# Patient Record
Sex: FEMALE | Race: WHITE | NOT HISPANIC OR LATINO | Employment: UNEMPLOYED
[De-identification: names, ages, dates, MRNs, and addresses within clinical notes are randomized per-mention and may not be internally consistent; named-entity substitution may affect disease eponyms.]

---

## 2017-06-24 ENCOUNTER — HEALTH MAINTENANCE LETTER (OUTPATIENT)
Age: 47
End: 2017-06-24

## 2019-06-18 ENCOUNTER — ANCILLARY PROCEDURE (OUTPATIENT)
Dept: MAMMOGRAPHY | Facility: CLINIC | Age: 49
End: 2019-06-18
Attending: OBSTETRICS & GYNECOLOGY
Payer: COMMERCIAL

## 2019-06-18 DIAGNOSIS — Z12.39 SCREENING BREAST EXAMINATION: ICD-10-CM

## 2019-06-18 PROCEDURE — 77067 SCR MAMMO BI INCL CAD: CPT

## 2019-06-18 PROCEDURE — 77063 BREAST TOMOSYNTHESIS BI: CPT

## 2019-11-19 ENCOUNTER — TRANSFERRED RECORDS (OUTPATIENT)
Dept: HEALTH INFORMATION MANAGEMENT | Facility: CLINIC | Age: 49
End: 2019-11-19

## 2019-11-19 ENCOUNTER — MEDICAL CORRESPONDENCE (OUTPATIENT)
Dept: HEALTH INFORMATION MANAGEMENT | Facility: CLINIC | Age: 49
End: 2019-11-19

## 2020-01-03 ENCOUNTER — TRANSFERRED RECORDS (OUTPATIENT)
Dept: HEALTH INFORMATION MANAGEMENT | Facility: CLINIC | Age: 50
End: 2020-01-03

## 2020-01-06 ENCOUNTER — TRANSFERRED RECORDS (OUTPATIENT)
Dept: HEALTH INFORMATION MANAGEMENT | Facility: CLINIC | Age: 50
End: 2020-01-06

## 2020-01-09 ENCOUNTER — MEDICAL CORRESPONDENCE (OUTPATIENT)
Dept: HEALTH INFORMATION MANAGEMENT | Facility: CLINIC | Age: 50
End: 2020-01-09

## 2020-01-14 ENCOUNTER — MEDICAL CORRESPONDENCE (OUTPATIENT)
Dept: HEALTH INFORMATION MANAGEMENT | Facility: CLINIC | Age: 50
End: 2020-01-14

## 2020-01-21 ENCOUNTER — DOCUMENTATION ONLY (OUTPATIENT)
Dept: CARDIOLOGY | Facility: CLINIC | Age: 50
End: 2020-01-21

## 2020-01-24 ENCOUNTER — DOCUMENTATION ONLY (OUTPATIENT)
Dept: CARDIOLOGY | Facility: CLINIC | Age: 50
End: 2020-01-24

## 2020-02-06 PROBLEM — I34.0 MILD MITRAL REGURGITATION: Status: ACTIVE | Noted: 2020-02-06

## 2020-02-06 PROBLEM — I51.7 MILD CONCENTRIC LEFT VENTRICULAR HYPERTROPHY (LVH): Status: ACTIVE | Noted: 2020-02-06

## 2020-02-06 PROBLEM — I35.1 MILD AORTIC REGURGITATION: Status: ACTIVE | Noted: 2020-02-06

## 2020-02-12 ENCOUNTER — OFFICE VISIT (OUTPATIENT)
Dept: CARDIOLOGY | Facility: CLINIC | Age: 50
End: 2020-02-12
Payer: COMMERCIAL

## 2020-02-12 VITALS
BODY MASS INDEX: 29.59 KG/M2 | DIASTOLIC BLOOD PRESSURE: 88 MMHG | SYSTOLIC BLOOD PRESSURE: 112 MMHG | HEART RATE: 68 BPM | HEIGHT: 62 IN

## 2020-02-12 DIAGNOSIS — I35.1 MILD AORTIC REGURGITATION: ICD-10-CM

## 2020-02-12 DIAGNOSIS — I51.7 MILD CONCENTRIC LEFT VENTRICULAR HYPERTROPHY (LVH): Primary | ICD-10-CM

## 2020-02-12 DIAGNOSIS — I34.0 MILD MITRAL REGURGITATION: ICD-10-CM

## 2020-02-12 PROCEDURE — 99203 OFFICE O/P NEW LOW 30 MIN: CPT | Performed by: INTERNAL MEDICINE

## 2020-02-12 PROCEDURE — 93000 ELECTROCARDIOGRAM COMPLETE: CPT | Performed by: INTERNAL MEDICINE

## 2020-02-12 NOTE — LETTER
2/12/2020    Rip Hays MD  [address]  [facility] [address]    RE: Lauren Calvo       Dear Colleague,    I had the pleasure of seeing Lauren Calvo in the [facility].    HPI and Plan:   See dictation    Orders Placed This Encounter   Procedures     EKG 12-lead complete w/read - Clinics (performed today)       Orders Placed This Encounter   Medications     Cyanocobalamin (B-12 PO)     Sig: Take by mouth daily     UNABLE TO FIND     Sig: 3 times daily MEDICATION NAME: Metagest     Vitamins-Lipotropics (LIPOGEN OR)     Sig: Take by mouth daily     MAGNESIUM CITRATE PO     Sig: Take by mouth daily     UNABLE TO FIND     Sig: daily MEDICATION NAME: Cardiogenics     UNABLE TO FIND     Sig: daily MEDICATION NAME: Omega Pure EPA-     Multiple Vitamins-Minerals (ZINC PO)     Sig: Take by mouth daily     UNABLE TO FIND     Sig: daily MEDICATION NAME: Cole Meriva Curcumin Phytosome     UNABLE TO FIND     Sig: daily MEDICATION NAME: Metabolic Synergy       Medications Discontinued During This Encounter   Medication Reason     biotin 5 MG CAPS Stopped by Patient     ketoconazole (NIZORAL) 2 % shampoo Stopped by Patient     Multiple Vitamins-Minerals (MULTIVITAL) TABS Therapy completed         Encounter Diagnoses   Name Primary?     Mild concentric left ventricular hypertrophy (LVH) Yes     Mild mitral regurgitation      Mild aortic regurgitation        CURRENT MEDICATIONS:  Current Outpatient Medications   Medication Sig Dispense Refill     Cyanocobalamin (B-12 PO) Take by mouth daily       MAGNESIUM CITRATE PO Take by mouth daily       Multiple Vitamins-Minerals (ZINC PO) Take by mouth daily       UNABLE TO FIND 3 times daily MEDICATION NAME: Metagest       UNABLE TO FIND daily MEDICATION NAME: Cardiogenics       UNABLE TO FIND daily MEDICATION NAME: Omega Pure EPA-       UNABLE TO FIND daily MEDICATION NAME: Cole Meriva Curcumin Phytosome        UNABLE TO FIND daily MEDICATION NAME: Metabolic Synergy       Vitamins-Lipotropics (LIPOGEN OR) Take by mouth daily         ALLERGIES     Allergies   Allergen Reactions     Compazine [Prochlorperazine] Shortness Of Breath     Egg White [Chicken-Derived Products (Egg)]      Tested positive to eggs allergy      Midrin [Isometheptene-Apap-Dichloral] Hives       PAST MEDICAL HISTORY:  History reviewed. No pertinent past medical history.    PAST SURGICAL HISTORY:  Past Surgical History:   Procedure Laterality Date     GYN SURGERY      3 C-sections       FAMILY HISTORY:  Family History   Problem Relation Age of Onset     Hypertension Mother      Other Cancer Father         bladder        SOCIAL HISTORY:  Social History     Socioeconomic History     Marital status:      Spouse name: None     Number of children: None     Years of education: None     Highest education level: None   Occupational History     None   Social Needs     Financial resource strain: None     Food insecurity:     Worry: None     Inability: None     Transportation needs:     Medical: None     Non-medical: None   Tobacco Use     Smoking status: Never Smoker     Smokeless tobacco: Never Used   Substance and Sexual Activity     Alcohol use: Not Currently     Alcohol/week: 0.0 standard drinks     Drug use: No     Sexual activity: Yes     Partners: Male   Lifestyle     Physical activity:     Days per week: None     Minutes per session: None     Stress: None   Relationships     Social connections:     Talks on phone: None     Gets together: None     Attends Taoist service: None     Active member of club or organization: None     Attends meetings of clubs or organizations: None     Relationship status: None     Intimate partner violence:     Fear of current or ex partner: None     Emotionally abused: None     Physically abused: None     Forced sexual activity: None   Other Topics Concern     Parent/sibling w/ CABG, MI or angioplasty before 65F  "55M? Not Asked   Social History Narrative     None       Review of Systems:  Skin:  Negative       Eyes:  Negative      ENT:  Negative      Respiratory:  Negative       Cardiovascular:  Negative      Gastroenterology: Negative      Genitourinary:  Negative      Musculoskeletal:  Negative      Neurologic:  Positive for numbness or tingling of hands left arm - nerve  Psychiatric:  Negative      Heme/Lymph/Imm:  Negative      Endocrine:  Negative        Physical Exam:  Vitals: /88   Pulse 68   Ht 1.575 m (5' 2\")   BMI 29.59 kg/m       Constitutional:  cooperative, alert and oriented, well developed, well nourished, in no acute distress        Skin:  warm and dry to the touch, no apparent skin lesions or masses noted          Head:  normocephalic, no masses or lesions        Eyes:  pupils equal and round, conjunctivae and lids unremarkable, sclera white, no xanthalasma, EOMS intact, no nystagmus        Lymph:No Cervical lymphadenopathy present     ENT:  no pallor or cyanosis, dentition good        Neck:  carotid pulses are full and equal bilaterally, JVP normal, no carotid bruit        Respiratory:  normal breath sounds, clear to auscultation, normal A-P diameter, normal symmetry, normal respiratory excursion, no use of accessory muscles         Cardiac: regular rhythm, normal S1/S2, no S3 or S4, apical impulse not displaced, no murmurs, gallops or rubs                                                         GI:  abdomen soft;BS normoactive        Extremities and Muscular Skeletal:  no deformities, clubbing, cyanosis, erythema observed;no edema              Neurological:  no gross motor deficits;affect appropriate        Psych:  oriented to time, person and place        CC  No referring provider defined for this encounter.                Thank you for allowing me to participate in the care of your patient.      Sincerely,     VALERIE MESSINA MD     [facility]    cc:   No " referring provider defined for this encounter.

## 2020-02-12 NOTE — LETTER
2/12/2020      Rip Hays MD  [address] [place]  [facility] [address]      RE: Lauren Calvo       Dear Colleague,    I had the pleasure of seeing Lauren Calvo in the [facility].    Service Date: 02/12/2020      CARDIOLOGY OFFICE PROGRESS NOTE       HISTORY OF PRESENT ILLNESS:  I had the opportunity to see Ms. Lauren Calvo in Cardiology Clinic today at [facility] in [place] for consultation regarding an abnormal ECG and concerns about her echocardiogram.      Ms. Calvo was seen in November for concerns about some left arm discomfort.  She describes some aching, painful discomfort in her left elbow with some radiation up to the shoulder and down to the forearm associated with some tingling.  Her arm was tender and seemed to be painful at different times but not specifically with exertion such as running or walking or climbing stairs.  She is an avid runner and has not had any chest or left arm discomfort with activity such as running.  However, her ECG done on 11/19/2019 showed some increased voltage in her precordial leads suggestive of left ventricular hypertrophy.  This prompted an echocardiogram which was done on 01/03/2020 at [facility].  I only have the report to review.  The report indicates borderline concentric left ventricular hypertrophy and mild regurgitation of the aortic and mitral valves.  The aortic valve is referred to as trileaflet with mild sclerosis.  When I reviewed the specific measurements, the interventricular septum measured 1.0 cm and the posterior wall measured 0.9 cm.  According to echocardiogram standards, these are normal measurements.  We consider left ventricular hypertrophy to be present when the thickness exceeds 1.2 cm.  The standards reported from the lab at [facility] indicate the presence of hypertrophy at greater than 1.0 cm which I do not believe is the standard and certainly, her  measurements do not suggest the possibility of hypertrophic cardiomyopathy or even hypertension-related hypertrophy.      She has never had hypertension.  Her cholesterol numbers are excellent without medical therapy.  She has never been a smoker.  She has no family history of cardiac disease and she does not have diabetes.  Her left ventricular function is normal with an ejection fraction of 60% without regional wall motion abnormalities.      PHYSICAL EXAMINATION:     VITAL SIGNS:  Today, her blood pressure is 112/88, heart rate 68 and weight was not done today.  Her height is 5 feet 2.    LUNGS:  Clear.     CARDIAC:  Heart rhythm is regular.  She has no cardiac murmurs and no carotid bruits or edema.      IMPRESSIONS:  Ms. Lauren Calov is a 49-year-old woman who had an ECG done in November for evaluation of some left arm discomfort.  That left arm discomfort sounds quite suspicious for musculoskeletal cause and highly atypical for cardiac cause.  The pain has improved significantly and has not involved any chest discomfort symptoms.  She has no cardiac risk factors.  I do not think that she is likely to have premature coronary artery disease.  I did offer her a stress test to evaluate that further if she would like to do that but she respectfully declined.      I repeated the ECG today and the voltage appears normal and the ECG is otherwise normal today.  She had an echocardiogram at the beginning of January which is likely a normal study with normal wall thickness and a minimal degree of central aortic regurgitation and presence of a trileaflet aortic valve.  I reassured her that the mild aortic valve regurgitation is not likely to progress to any significant degree and does not require further evaluation or treatment at this time.  Certainly, her echocardiogram does not suggest the presence of left ventricular hypertrophy or hypertrophic cardiomyopathy.  No additional evaluation is necessary or recommended.       I encouraged her to contact me if she has any progression in her left arm discomfort symptoms, especially with involvement of chest discomfort and we will certainly proceed with additional evaluation including stress testing.  At this time, I think the risk of this being a cardiac issue is very low and do not need to pursue that further.      Thank you for allowing me to participate in Ms. Sevilla's care.      Valerie Messina MD, FACC       cc:   Rip Hays MD    [address]   [place] [address]         VALERIE MESSINA MD, [facility]             D: 2020   T: 2020   MT: JULIO      Name:     SEAN SEVILLA   MRN:      [id number]        Account:      [id number]   :      1970           Service Date: 2020      Document: [id number]         Outpatient Encounter Medications as of 2020   Medication Sig Dispense Refill     Cyanocobalamin (B-12 PO) Take by mouth daily       MAGNESIUM CITRATE PO Take by mouth daily       Multiple Vitamins-Minerals (ZINC PO) Take by mouth daily       UNABLE TO FIND 3 times daily MEDICATION NAME: Metagest       UNABLE TO FIND daily MEDICATION NAME: Cardiogenics       UNABLE TO FIND daily MEDICATION NAME: Omega Pure EPA-       UNABLE TO FIND daily MEDICATION NAME: Cole Meriva Curcumin Phytosome       UNABLE TO FIND daily MEDICATION NAME: Metabolic Synergy       Vitamins-Lipotropics (LIPOGEN OR) Take by mouth daily       [DISCONTINUED] biotin 5 MG CAPS Take 1 capsule by mouth       [DISCONTINUED] ketoconazole (NIZORAL) 2 % shampoo   6     [DISCONTINUED] Multiple Vitamins-Minerals (MULTIVITAL) TABS Take 1 tablet by mouth       No facility-administered encounter medications on file as of 2020.        Again, thank you for allowing me to participate in the care of your patient.      Sincerely,    VALERIE MESSINA MD     [facility]

## 2020-02-12 NOTE — PROGRESS NOTES
Service Date: 02/12/2020      CARDIOLOGY OFFICE PROGRESS NOTE       HISTORY OF PRESENT ILLNESS:  I had the opportunity to see Ms. Lauren Calvo in Cardiology Clinic today at [facility] in [place] for consultation regarding an abnormal ECG and concerns about her echocardiogram.      Ms. Calvo was seen in November for concerns about some left arm discomfort.  She describes some aching, painful discomfort in her left elbow with some radiation up to the shoulder and down to the forearm associated with some tingling.  Her arm was tender and seemed to be painful at different times but not specifically with exertion such as running or walking or climbing stairs.  She is an avid runner and has not had any chest or left arm discomfort with activity such as running.  However, her ECG done on 11/19/2019 showed some increased voltage in her precordial leads suggestive of left ventricular hypertrophy.  This prompted an echocardiogram which was done on 01/03/2020 at [facility].  I only have the report to review.  The report indicates borderline concentric left ventricular hypertrophy and mild regurgitation of the aortic and mitral valves.  The aortic valve is referred to as trileaflet with mild sclerosis.  When I reviewed the specific measurements, the interventricular septum measured 1.0 cm and the posterior wall measured 0.9 cm.  According to echocardiogram standards, these are normal measurements.  We consider left ventricular hypertrophy to be present when the thickness exceeds 1.2 cm.  The standards reported from the lab at [facility] indicate the presence of hypertrophy at greater than 1.0 cm which I do not believe is the standard and certainly, her measurements do not suggest the possibility of hypertrophic cardiomyopathy or even hypertension-related hypertrophy.      She has never had hypertension.  Her cholesterol numbers are excellent without medical therapy.  She has never been a smoker.   She has no family history of cardiac disease and she does not have diabetes.  Her left ventricular function is normal with an ejection fraction of 60% without regional wall motion abnormalities.      PHYSICAL EXAMINATION:     VITAL SIGNS:  Today, her blood pressure is 112/88, heart rate 68 and weight was not done today.  Her height is 5 feet 2.    LUNGS:  Clear.     CARDIAC:  Heart rhythm is regular.  She has no cardiac murmurs and no carotid bruits or edema.      IMPRESSIONS:  Ms. Lauren Calvo is a 49-year-old woman who had an ECG done in November for evaluation of some left arm discomfort.  That left arm discomfort sounds quite suspicious for musculoskeletal cause and highly atypical for cardiac cause.  The pain has improved significantly and has not involved any chest discomfort symptoms.  She has no cardiac risk factors.  I do not think that she is likely to have premature coronary artery disease.  I did offer her a stress test to evaluate that further if she would like to do that but she respectfully declined.      I repeated the ECG today and the voltage appears normal and the ECG is otherwise normal today.  She had an echocardiogram at the beginning of January which is likely a normal study with normal wall thickness and a minimal degree of central aortic regurgitation and presence of a trileaflet aortic valve.  I reassured her that the mild aortic valve regurgitation is not likely to progress to any significant degree and does not require further evaluation or treatment at this time.  Certainly, her echocardiogram does not suggest the presence of left ventricular hypertrophy or hypertrophic cardiomyopathy.  No additional evaluation is necessary or recommended.      I encouraged her to contact me if she has any progression in her left arm discomfort symptoms, especially with involvement of chest discomfort and we will certainly proceed with additional evaluation including stress testing.  At this time, I  think the risk of this being a cardiac issue is very low and do not need to pursue that further.      Thank you for allowing me to participate in Ms. Sevilla's care.      Valerie Messina MD, [facility]       cc:   Rip Hays MD    [address]   [place] [address]         VALERIE MESSINA MD, FACC             D: 2020   T: 2020   MT: JULIO      Name:     SEAN SEVILLA   MRN:      [id number]        Account:      [id number]   :      1970           Service Date: 2020      Document: [id number]

## 2020-02-12 NOTE — PROGRESS NOTES
HPI and Plan:   See dictation    Orders Placed This Encounter   Procedures     EKG 12-lead complete w/read - Clinics (performed today)       Orders Placed This Encounter   Medications     Cyanocobalamin (B-12 PO)     Sig: Take by mouth daily     UNABLE TO FIND     Sig: 3 times daily MEDICATION NAME: Metagest     Vitamins-Lipotropics (LIPOGEN OR)     Sig: Take by mouth daily     MAGNESIUM CITRATE PO     Sig: Take by mouth daily     UNABLE TO FIND     Sig: daily MEDICATION NAME: Cardiogenics     UNABLE TO FIND     Sig: daily MEDICATION NAME: Omega Pure EPA-     Multiple Vitamins-Minerals (ZINC PO)     Sig: Take by mouth daily     UNABLE TO FIND     Sig: daily MEDICATION NAME: Cole Meriva Curcumin Phytosome     UNABLE TO FIND     Sig: daily MEDICATION NAME: Metabolic Synergy       Medications Discontinued During This Encounter   Medication Reason     biotin 5 MG CAPS Stopped by Patient     ketoconazole (NIZORAL) 2 % shampoo Stopped by Patient     Multiple Vitamins-Minerals (MULTIVITAL) TABS Therapy completed         Encounter Diagnoses   Name Primary?     Mild concentric left ventricular hypertrophy (LVH) Yes     Mild mitral regurgitation      Mild aortic regurgitation        CURRENT MEDICATIONS:  Current Outpatient Medications   Medication Sig Dispense Refill     Cyanocobalamin (B-12 PO) Take by mouth daily       MAGNESIUM CITRATE PO Take by mouth daily       Multiple Vitamins-Minerals (ZINC PO) Take by mouth daily       UNABLE TO FIND 3 times daily MEDICATION NAME: Metagest       UNABLE TO FIND daily MEDICATION NAME: Cardiogenics       UNABLE TO FIND daily MEDICATION NAME: Omega Pure EPA-       UNABLE TO FIND daily MEDICATION NAME: Cole Meriva Curcumin Phytosome       UNABLE TO FIND daily MEDICATION NAME: Metabolic Synergy       Vitamins-Lipotropics (LIPOGEN OR) Take by mouth daily         ALLERGIES     Allergies   Allergen Reactions     Compazine [Prochlorperazine] Shortness Of Breath     Egg White  [Chicken-Derived Products (Egg)]      Tested positive to eggs allergy      Midrin [Isometheptene-Apap-Dichloral] Hives       PAST MEDICAL HISTORY:  History reviewed. No pertinent past medical history.    PAST SURGICAL HISTORY:  Past Surgical History:   Procedure Laterality Date     GYN SURGERY      3 C-sections       FAMILY HISTORY:  Family History   Problem Relation Age of Onset     Hypertension Mother      Other Cancer Father         bladder        SOCIAL HISTORY:  Social History     Socioeconomic History     Marital status:      Spouse name: None     Number of children: None     Years of education: None     Highest education level: None   Occupational History     None   Social Needs     Financial resource strain: None     Food insecurity:     Worry: None     Inability: None     Transportation needs:     Medical: None     Non-medical: None   Tobacco Use     Smoking status: Never Smoker     Smokeless tobacco: Never Used   Substance and Sexual Activity     Alcohol use: Not Currently     Alcohol/week: 0.0 standard drinks     Drug use: No     Sexual activity: Yes     Partners: Male   Lifestyle     Physical activity:     Days per week: None     Minutes per session: None     Stress: None   Relationships     Social connections:     Talks on phone: None     Gets together: None     Attends Gnosticism service: None     Active member of club or organization: None     Attends meetings of clubs or organizations: None     Relationship status: None     Intimate partner violence:     Fear of current or ex partner: None     Emotionally abused: None     Physically abused: None     Forced sexual activity: None   Other Topics Concern     Parent/sibling w/ CABG, MI or angioplasty before 65F 55M? Not Asked   Social History Narrative     None       Review of Systems:  Skin:  Negative       Eyes:  Negative      ENT:  Negative      Respiratory:  Negative       Cardiovascular:  Negative      Gastroenterology: Negative     "  Genitourinary:  Negative      Musculoskeletal:  Negative      Neurologic:  Positive for numbness or tingling of hands left arm - nerve  Psychiatric:  Negative      Heme/Lymph/Imm:  Negative      Endocrine:  Negative        Physical Exam:  Vitals: /88   Pulse 68   Ht 1.575 m (5' 2\")   BMI 29.59 kg/m      Constitutional:  cooperative, alert and oriented, well developed, well nourished, in no acute distress        Skin:  warm and dry to the touch, no apparent skin lesions or masses noted          Head:  normocephalic, no masses or lesions        Eyes:  pupils equal and round, conjunctivae and lids unremarkable, sclera white, no xanthalasma, EOMS intact, no nystagmus        Lymph:No Cervical lymphadenopathy present     ENT:  no pallor or cyanosis, dentition good        Neck:  carotid pulses are full and equal bilaterally, JVP normal, no carotid bruit        Respiratory:  normal breath sounds, clear to auscultation, normal A-P diameter, normal symmetry, normal respiratory excursion, no use of accessory muscles         Cardiac: regular rhythm, normal S1/S2, no S3 or S4, apical impulse not displaced, no murmurs, gallops or rubs                                                         GI:  abdomen soft;BS normoactive        Extremities and Muscular Skeletal:  no deformities, clubbing, cyanosis, erythema observed;no edema              Neurological:  no gross motor deficits;affect appropriate        Psych:  oriented to time, person and place        CC  No referring provider defined for this encounter.              "

## 2021-01-15 ENCOUNTER — HEALTH MAINTENANCE LETTER (OUTPATIENT)
Age: 51
End: 2021-01-15

## 2021-01-24 ENCOUNTER — HEALTH MAINTENANCE LETTER (OUTPATIENT)
Age: 51
End: 2021-01-24

## 2021-09-05 ENCOUNTER — HEALTH MAINTENANCE LETTER (OUTPATIENT)
Age: 51
End: 2021-09-05

## 2022-02-19 ENCOUNTER — HEALTH MAINTENANCE LETTER (OUTPATIENT)
Age: 52
End: 2022-02-19

## 2022-10-22 ENCOUNTER — HEALTH MAINTENANCE LETTER (OUTPATIENT)
Age: 52
End: 2022-10-22

## 2023-04-01 ENCOUNTER — HEALTH MAINTENANCE LETTER (OUTPATIENT)
Age: 53
End: 2023-04-01

## 2023-05-03 ENCOUNTER — TRANSCRIBE ORDERS (OUTPATIENT)
Dept: OTHER | Age: 53
End: 2023-05-03

## 2023-05-03 DIAGNOSIS — M25.561 PAIN IN LATERAL PORTION OF RIGHT KNEE: Primary | ICD-10-CM

## 2023-08-21 ENCOUNTER — TELEPHONE (OUTPATIENT)
Dept: CARDIOLOGY | Facility: CLINIC | Age: 53
End: 2023-08-21
Payer: COMMERCIAL

## 2023-08-21 NOTE — TELEPHONE ENCOUNTER
Patient states that she had a syncopal episode that resulted in an ED visit.  Patient states that she has had syncope in the past since a young age.  Patient is now wearing a zio patch.  Patient is asking if she needs a cardiology appointment. Discussed with patient that if advised by ED to see cardiology she should make an appointment.  Patient verbalized understanding and agreement with plan and will call to schedule.  Alie Quiroz RN on 8/21/2023 at 12:37 PM

## 2023-08-21 NOTE — TELEPHONE ENCOUNTER
[facility] Call Center    Phone Message    May a detailed message be left on voicemail: yes     Reason for Call: Other: Patient called wanting to speak with Dr. Pierson or someone on the care team. Patient stated they had an episode of syncope over the weekend where they fell in the shower and hit their head causing a cut. Patient went to the ER and was given a ziopatch monitor to put on, and was told to make an appointment to see their cardiologist. Patient is wondering if they should still come in to be seen, if results come back as normal. Patient last seen Dr. Pierson in 2020. Please call patient back to further discuss.     Action Taken: Other: Cardiology    Travel Screening: Not Applicable    Thank you!  [facility]

## 2023-09-27 ENCOUNTER — OFFICE VISIT (OUTPATIENT)
Dept: CARDIOLOGY | Facility: CLINIC | Age: 53
End: 2023-09-27
Payer: COMMERCIAL

## 2023-09-27 VITALS
HEART RATE: 67 BPM | BODY MASS INDEX: 29.59 KG/M2 | DIASTOLIC BLOOD PRESSURE: 87 MMHG | OXYGEN SATURATION: 100 % | SYSTOLIC BLOOD PRESSURE: 125 MMHG | HEIGHT: 62 IN

## 2023-09-27 DIAGNOSIS — R55 SYNCOPE, UNSPECIFIED SYNCOPE TYPE: Primary | ICD-10-CM

## 2023-09-27 PROCEDURE — 99204 OFFICE O/P NEW MOD 45 MIN: CPT | Performed by: INTERNAL MEDICINE

## 2023-09-27 PROCEDURE — 93000 ELECTROCARDIOGRAM COMPLETE: CPT | Performed by: INTERNAL MEDICINE

## 2023-09-27 NOTE — PROGRESS NOTES
"  General Cardiology Clinic Progress Note  Lauren Calvo MRN# [id number]   YOB: 1970 Age: 52 year old       Reason for visit: Recurrent syncope    History of presenting illness:    I had the opportunity to see Lauren Calvo at [facility] today for evaluation of recurrent syncope.  Since the age of 14, she has had occasional episodes of sudden syncope.  This situation is always the same.  She wakes up in the middle of the night, feeling nauseous, gets out of bed to go to the bathroom because she is worried about vomiting, and then finds herself on the floor unable to move.  She has no lightheadedness that precedes these events and feels no palpitations.  These episodes have occurred 2-3 times a year for the last 40 years.  Most recently, she had an episode on 8/19/2023 which was very similar to the previous ones.  However, this time she fell forward, striking her head on the floor, and sustained a laceration to her head.  She woke up in a pool of blood.  Her  woke up as well and tried to get her up but she lost consciousness briefly again when he did so.  The duration of unconsciousness is brief, but her family has noted that she appears to be either staring straight forward and unresponsive briefly or her eyes are rolled back in her head.  When she regains awareness, she is typically unable to move for a few moments and requires that her system \"reboots\" before she is able to sit up again and move normally.    She is normally very active, running 18 miles a week, 10-minute miles, with no symptoms of shortness of breath or chest discomfort.  She is normally very active and energetic with no palpitations lightheadedness or syncopal events during the day.  She has never had vasovagal events in the usual settings such as during prolonged standing, or during episodes of nausea or pain during the daytime.  She has had blood drawn many times and has no problem with that.    She had an " echocardiogram done in 2020 which looked essentially normal with mild aortic and mitral valve regurgitation.    She had an EKG done today which demonstrates normal sinus rhythm without abnormality.  She had a Zio patch monitor for 12 days through [facility].  I reviewed that result.  Her rhythm was normal sinus with rare PACs and PVCs.  She had a few episodes of brief SVT lasting less than 10 beats but no arrhythmias that could cause syncope    Her examination is normal her blood pressure is 125/87 with a heart rate of 67.  Her heart rhythm is regular with no cardiac murmurs            Assessment and Plan:     ASSESSMENT:    Ms. Cydney Calvo is a 52-year-old woman with recurrent episodes of nighttime syncope 2-3 times a year over the last nearly 40 years.  During her most recent episode in August, she sustained a laceration to her forehead and required stitches.  She is now here to discuss possible cardiac etiologies of this problem.  She has seen neurology and has not identified any specific neurologic issue.    Her episodes sound vasovagal in many ways.  She has preceding nausea and wakes up quickly after falling to the floor.  Other types of cardiac arrhythmias such as intermittent complete heart block would also be a consideration.  This is unlikely to be seizure based on the fact that she has no postictal confusion.    Her Zio patch monitor did not show any explanation for syncope.  She did not capture an episode on the Zio patch monitor nor has she captured an episode during any previous monitoring session.  I suggested that we implant a loop recorder to understand more about her heart rhythm at the time of her episode.  We discussed the risk of this procedure, primarily involving the possibility of infection and bleeding.  She understands and agrees to go ahead with that implantation procedure.      I also suggested that her  take her blood pressure at the time of her fainting or near fainting  "episodes.  They have an automated cuff at home.    I will then meet with her in 6 months to review the results of her loop recorder or sooner if we see any significant arrhythmias.    Stevan Pierson MD           Orders this Visit:  Orders Placed This Encounter   Procedures    Follow-Up with Cardiology    EKG 12-lead complete w/read - Clinics (performed today)    Case Request EP: Loop Recorder Implant     No orders of the defined types were placed in this encounter.    There are no discontinued medications.    Today's clinic visit entailed:  Review of the result(s) of each unique test - ECG, Zio patch monitor, echocardiogram  The following tests were independently interpreted by me as noted in my documentation: ECG  Ordering of each unique test  Prescription drug management  40 minutes spent by me on the date of the encounter doing chart review, history and exam, documentation and further activities per the note  Provider  Link to [facility] Help Grid     The level of medical decision making during this visit was of high complexity.           Review of Systems:     Review of Systems:  Skin:  Negative     Eyes:  Negative    ENT:  Negative    Respiratory:  Negative    Cardiovascular:  exercise intolerance;fatigue;Negative for;dizziness;lightheadedness;chest pain;palpitations;edema    Gastroenterology: Negative    Genitourinary:  Negative    Musculoskeletal:  Negative    Neurologic:  Negative    Psychiatric:  Negative    Heme/Lymph/Imm:  Negative    Endocrine:  Negative              Physical Exam:     Vitals: /87 (BP Location: Left arm, Patient Position: Sitting)   Pulse 67   Ht 1.575 m (5' 2\")   SpO2 100%   BMI 29.59 kg/m    Constitutional: Well nourished and in no apparent distress.  Eyes: Pupils equal, round. Sclerae anicteric.   HEENT: Normocephalic, atraumatic.   Neck: Supple. JVD   Respiratory: Breathing non-labored. Lungs clear to auscultation bilaterally. No crackles, wheezes, rhonchi, or " rales.  Cardiovascular:  Regular rate and rhythm, normal S1 and S2. No murmur, rub, or gallop.  Skin: Warm, dry. No rashes, cyanosis, or xanthelasma.  Extremities: No edema.  Neurologic: No gross motor deficits. Alert, awake, and oriented to person, place and time.  Psychiatric: Affect appropriate.             Medications:     Current Outpatient Medications   Medication Sig Dispense Refill    Cyanocobalamin (B-12 PO) Take by mouth daily      MAGNESIUM CITRATE PO Take by mouth daily      Multiple Vitamins-Minerals (ZINC PO) Take by mouth daily      Vitamins-Lipotropics (LIPOGEN OR) Take by mouth daily      UNABLE TO FIND 3 times daily MEDICATION NAME: Metagest      UNABLE TO FIND daily MEDICATION NAME: Cardiogenics      UNABLE TO FIND daily MEDICATION NAME: Omega Pure EPA-      UNABLE TO FIND daily MEDICATION NAME: Cole Meriva Curcumin Phytosome      UNABLE TO FIND daily MEDICATION NAME: Metabolic Synergy         Family History   Problem Relation Age of Onset    Hypertension Mother     Other Cancer Father         bladder        Social History     Socioeconomic History    Marital status:      Spouse name: Not on file    Number of children: Not on file    Years of education: Not on file    Highest education level: Not on file   Occupational History    Not on file   Tobacco Use    Smoking status: Never    Smokeless tobacco: Never   Substance and Sexual Activity    Alcohol use: Yes     Comment: occ.    Drug use: No    Sexual activity: Yes     Partners: Male   Other Topics Concern    Parent/sibling w/ CABG, MI or angioplasty before 65F 55M? Not Asked   Social History Narrative    Not on file     Social Determinants of Health     Financial Resource Strain: Not on file   Food Insecurity: Not on file   Transportation Needs: Not on file   Physical Activity: Not on file   Stress: Not on file   Social Connections: Not on file   Interpersonal Safety: Not on file   Housing Stability: Not on file            Past  Medical History:   History reviewed. No pertinent past medical history.           Past Surgical History:     Past Surgical History:   Procedure Laterality Date    GYN SURGERY      3 C-sections              Allergies:   Compazine [prochlorperazine], Egg white [chicken-derived products (egg)], and Midrin [isometheptene-apap-dichloral]       Data:   All laboratory data reviewed:    No lab results found.    Invalid input(s): CMP, CBC    Lab Results   Component Value Date    WBC 8.4 12/15/2015    RBC 3.92 12/15/2015    HGB 12.3 12/15/2015    HCT 37.0 12/15/2015    MCV 94 12/15/2015    MCH 31.4 12/15/2015    MCHC 33.2 12/15/2015    RDW 11.2 12/15/2015     12/15/2015       Lab Results   Component Value Date     12/15/2015    POTASSIUM 3.8 12/15/2015    CHLORIDE 106 12/15/2015    CO2 27 12/15/2015    ANIONGAP 8 12/15/2015    GLC 97 12/15/2015    BUN 9 12/15/2015    CR 0.84 12/15/2015    GFRESTIMATED 72 12/15/2015    GFRESTBLACK 88 12/15/2015    CAROL 9.1 12/15/2015      Lab Results   Component Value Date    AST 14 12/15/2015    ALT 21 12/15/2015       No results found for: A1C    No results found for: VALERIE NICOLE MD  [facility]

## 2023-09-27 NOTE — LETTER
"9/27/2023    Rip Hays MD  [address] [place]  [facility] [address]    RE: Lauren Calvo       Dear Colleague,     I had the pleasure of seeing Lauren Calvo in the [facility].    General Cardiology Clinic Progress Note  Lauren Calvo MRN# [id number]   YOB: 1970 Age: 52 year old       Reason for visit: Recurrent syncope    History of presenting illness:    I had the opportunity to see Lauren Calvo at [facility] today for evaluation of recurrent syncope.  Since the age of 14, she has had occasional episodes of sudden syncope.  This situation is always the same.  She wakes up in the middle of the night, feeling nauseous, gets out of bed to go to the bathroom because she is worried about vomiting, and then finds herself on the floor unable to move.  She has no lightheadedness that precedes these events and feels no palpitations.  These episodes have occurred 2-3 times a year for the last 40 years.  Most recently, she had an episode on 8/19/2023 which was very similar to the previous ones.  However, this time she fell forward, striking her head on the floor, and sustained a laceration to her head.  She woke up in a pool of blood.  Her  woke up as well and tried to get her up but she lost consciousness briefly again when he did so.  The duration of unconsciousness is brief, but her family has noted that she appears to be either staring straight forward and unresponsive briefly or her eyes are rolled back in her head.  When she regains awareness, she is typically unable to move for a few moments and requires that her system \"reboots\" before she is able to sit up again and move normally.    She is normally very active, running 18 miles a week, 10-minute miles, with no symptoms of shortness of breath or chest discomfort.  She is normally very active and energetic with no palpitations lightheadedness or syncopal events during the day.  She " has never had vasovagal events in the usual settings such as during prolonged standing, or during episodes of nausea or pain during the daytime.  She has had blood drawn many times and has no problem with that.    She had an echocardiogram done in 2020 which looked essentially normal with mild aortic and mitral valve regurgitation.    She had an EKG done today which demonstrates normal sinus rhythm without abnormality.  She had a Zio patch monitor for 12 days through [facility].  I reviewed that result.  Her rhythm was normal sinus with rare PACs and PVCs.  She had a few episodes of brief SVT lasting less than 10 beats but no arrhythmias that could cause syncope    Her examination is normal her blood pressure is 125/87 with a heart rate of 67.  Her heart rhythm is regular with no cardiac murmurs            Assessment and Plan:     ASSESSMENT:    Ms. Cydney Calvo is a 52-year-old woman with recurrent episodes of nighttime syncope 2-3 times a year over the last nearly 40 years.  During her most recent episode in August, she sustained a laceration to her forehead and required stitches.  She is now here to discuss possible cardiac etiologies of this problem.  She has seen neurology and has not identified any specific neurologic issue.    Her episodes sound vasovagal in many ways.  She has preceding nausea and wakes up quickly after falling to the floor.  Other types of cardiac arrhythmias such as intermittent complete heart block would also be a consideration.  This is unlikely to be seizure based on the fact that she has no postictal confusion.    Her Zio patch monitor did not show any explanation for syncope.  She did not capture an episode on the Zio patch monitor nor has she captured an episode during any previous monitoring session.  I suggested that we implant a loop recorder to understand more about her heart rhythm at the time of her episode.  I also suggested that her  take her blood pressure at  "these times.  They have an automated cuff at home.    I will then meet with her in 6 months to review the results of her loop recorder or sooner if we see any significant arrhythmias.    Stevan Pierson MD           Orders this Visit:  Orders Placed This Encounter   Procedures    Follow-Up with Cardiology    EKG 12-lead complete w/read - Clinics (performed today)    Case Request EP: Loop Recorder Implant     No orders of the defined types were placed in this encounter.    There are no discontinued medications.    Today's clinic visit entailed:  Review of the result(s) of each unique test - ECG, Zio patch monitor, echocardiogram  The following tests were independently interpreted by me as noted in my documentation: ECG  Ordering of each unique test  Prescription drug management  40 minutes spent by me on the date of the encounter doing chart review, history and exam, documentation and further activities per the note  Provider  Link to [facility] Help Grid     The level of medical decision making during this visit was of high complexity.           Review of Systems:     Review of Systems:  Skin:  Negative     Eyes:  Negative    ENT:  Negative    Respiratory:  Negative    Cardiovascular:  exercise intolerance;fatigue;Negative for;dizziness;lightheadedness;chest pain;palpitations;edema    Gastroenterology: Negative    Genitourinary:  Negative    Musculoskeletal:  Negative    Neurologic:  Negative    Psychiatric:  Negative    Heme/Lymph/Imm:  Negative    Endocrine:  Negative              Physical Exam:     Vitals: /87 (BP Location: Left arm, Patient Position: Sitting)   Pulse 67   Ht 1.575 m (5' 2\")   SpO2 100%   BMI 29.59 kg/m    Constitutional: Well nourished and in no apparent distress.  Eyes: Pupils equal, round. Sclerae anicteric.   HEENT: Normocephalic, atraumatic.   Neck: Supple. JVD   Respiratory: Breathing non-labored. Lungs clear to auscultation bilaterally. No crackles, wheezes, rhonchi, or " rales.  Cardiovascular:  Regular rate and rhythm, normal S1 and S2. No murmur, rub, or gallop.  Skin: Warm, dry. No rashes, cyanosis, or xanthelasma.  Extremities: No edema.  Neurologic: No gross motor deficits. Alert, awake, and oriented to person, place and time.  Psychiatric: Affect appropriate.             Medications:     Current Outpatient Medications   Medication Sig Dispense Refill    Cyanocobalamin (B-12 PO) Take by mouth daily      MAGNESIUM CITRATE PO Take by mouth daily      Multiple Vitamins-Minerals (ZINC PO) Take by mouth daily      Vitamins-Lipotropics (LIPOGEN OR) Take by mouth daily      UNABLE TO FIND 3 times daily MEDICATION NAME: Metagest      UNABLE TO FIND daily MEDICATION NAME: Cardiogenics      UNABLE TO FIND daily MEDICATION NAME: Omega Pure EPA-      UNABLE TO FIND daily MEDICATION NAME: Cole Meriva Curcumin Phytosome      UNABLE TO FIND daily MEDICATION NAME: Metabolic Synergy         Family History   Problem Relation Age of Onset    Hypertension Mother     Other Cancer Father         bladder        Social History     Socioeconomic History    Marital status:      Spouse name: Not on file    Number of children: Not on file    Years of education: Not on file    Highest education level: Not on file   Occupational History    Not on file   Tobacco Use    Smoking status: Never    Smokeless tobacco: Never   Substance and Sexual Activity    Alcohol use: Yes     Comment: occ.    Drug use: No    Sexual activity: Yes     Partners: Male   Other Topics Concern    Parent/sibling w/ CABG, MI or angioplasty before 65F 55M? Not Asked   Social History Narrative    Not on file     Social Determinants of Health     Financial Resource Strain: Not on file   Food Insecurity: Not on file   Transportation Needs: Not on file   Physical Activity: Not on file   Stress: Not on file   Social Connections: Not on file   Interpersonal Safety: Not on file   Housing Stability: Not on file            Past  Medical History:   History reviewed. No pertinent past medical history.           Past Surgical History:     Past Surgical History:   Procedure Laterality Date    GYN SURGERY      3 C-sections              Allergies:   Compazine [prochlorperazine], Egg white [chicken-derived products (egg)], and Midrin [isometheptene-apap-dichloral]       Data:   All laboratory data reviewed:    No lab results found.    Invalid input(s): CMP, CBC    Lab Results   Component Value Date    WBC 8.4 12/15/2015    RBC 3.92 12/15/2015    HGB 12.3 12/15/2015    HCT 37.0 12/15/2015    MCV 94 12/15/2015    MCH 31.4 12/15/2015    MCHC 33.2 12/15/2015    RDW 11.2 12/15/2015     12/15/2015       Lab Results   Component Value Date     12/15/2015    POTASSIUM 3.8 12/15/2015    CHLORIDE 106 12/15/2015    CO2 27 12/15/2015    ANIONGAP 8 12/15/2015    GLC 97 12/15/2015    BUN 9 12/15/2015    CR 0.84 12/15/2015    GFRESTIMATED 72 12/15/2015    GFRESTBLACK 88 12/15/2015    CAROL 9.1 12/15/2015      Lab Results   Component Value Date    AST 14 12/15/2015    ALT 21 12/15/2015       No results found for: A1C    No results found for: INR      VALERIE MESSINA MD  [facility]    Thank you for allowing me to participate in the care of your patient.      Sincerely,     VALERIE MESSINA MD     [facility]  cc:   Referred Self,

## 2023-09-27 NOTE — PATIENT INSTRUCTIONS
It was a pleasure seeing you today and thank you for allowing me to be a part of your health care team.  Should you have any questions regarding your visit or future needs please feel free to reach out to my care team for assistance.      Thank you, Dr. Stevan Pierson        **Nursing: [phone]       **Scheduling: [phone]

## 2023-10-11 DIAGNOSIS — R55 SYNCOPE: Primary | ICD-10-CM

## 2023-10-11 RX ORDER — LIDOCAINE 40 MG/G
CREAM TOPICAL
Status: CANCELLED | OUTPATIENT
Start: 2023-10-11

## 2023-10-11 NOTE — PROGRESS NOTES
Called patient with pre-procedure instructions for Loop implant and left message to call back.     Anticoagulation: (No hold for NOAC)     Pt informed to stop solid foods 8 hrs before procedure. (8:00 am)     Stop Clear liquids 2 hrs before procedure. (2:00 pm)     Instructed pt to shower the morning of the procedure, and then put on a clean shirt in order to help prevent infection.      Patient is aware there will only be a local anesthetic used. No need for  nor do they need to have someone stay with them overnight.     Asked pt to take temperature the morning of the procedure and call [facility] at [phone] if it is above 100.0 or if they are feeling unwell the morning of the procedure or evening before.      Pt aware of arrival time 2:00 PM (lab work is needed) and location   Pt aware procedure time is only an estimate.

## 2023-10-12 NOTE — PROGRESS NOTES
Received message from patient returning call to review pre-procedure instructions.  Attempted to call patient back on the cell phone number provided and there was no answer and the voicemail box was full.  Called and left a general message on patient's home line requesting a call back.     MARGARETTE Rondon     1500 addendum:  Received call back from patient.  Reviewed instructions as outlined in MARGARETTE Schumacher's note.  Pt verbalized understanding of instructions.  Also reviewed how loop recorders work, normal monitoring period, and what to do if patient has a symptomatic episode.  Pt was appreciative of all the information and will call with further questions.  MARGARETTE Rondon

## 2023-10-13 NOTE — PROGRESS NOTES
Chart reviewed for upcoming procedure on 10/16/23  CSE: Yes  Pertinent allergies: No  Contrast allergy: No  Anticoagulation: No  GLP-1: No  Orders in place: Yes  H&P completed: 9/27/23  Heart Clinic pre-procedure phone call: 10/12/23

## 2023-10-16 ENCOUNTER — HOSPITAL ENCOUNTER (OUTPATIENT)
Facility: CLINIC | Age: 53
Discharge: HOME | End: 2023-10-16
Admitting: INTERNAL MEDICINE
Payer: COMMERCIAL

## 2023-10-16 VITALS
HEART RATE: 71 BPM | RESPIRATION RATE: 14 BRPM | OXYGEN SATURATION: 100 % | BODY MASS INDEX: 27 KG/M2 | TEMPERATURE: 98.3 F | DIASTOLIC BLOOD PRESSURE: 88 MMHG | SYSTOLIC BLOOD PRESSURE: 136 MMHG | HEIGHT: 62 IN | WEIGHT: 146.7 LBS

## 2023-10-16 DIAGNOSIS — R55 SYNCOPE: ICD-10-CM

## 2023-10-16 DIAGNOSIS — R55 SYNCOPE, UNSPECIFIED SYNCOPE TYPE: Primary | ICD-10-CM

## 2023-10-16 LAB
ANION GAP SERPL CALCULATED.3IONS-SCNC: 15 MMOL/L (ref 7–15)
BUN SERPL-MCNC: 7 MG/DL (ref 6–20)
CALCIUM SERPL-MCNC: 9.8 MG/DL (ref 8.6–10)
CHLORIDE SERPL-SCNC: 104 MMOL/L (ref 98–107)
CREAT SERPL-MCNC: 0.67 MG/DL (ref 0.51–0.95)
DEPRECATED HCO3 PLAS-SCNC: 23 MMOL/L (ref 22–29)
EGFRCR SERPLBLD CKD-EPI 2021: >90 ML/MIN/1.73M2
ERYTHROCYTE [DISTWIDTH] IN BLOOD BY AUTOMATED COUNT: 11.4 % (ref 10–15)
GLUCOSE SERPL-MCNC: 89 MG/DL (ref 70–99)
HCT VFR BLD AUTO: 36.9 % (ref 35–47)
HGB BLD-MCNC: 12.3 G/DL (ref 11.7–15.7)
MCH RBC QN AUTO: 31.3 PG (ref 26.5–33)
MCHC RBC AUTO-ENTMCNC: 33.3 G/DL (ref 31.5–36.5)
MCV RBC AUTO: 94 FL (ref 78–100)
PLATELET # BLD AUTO: 269 10E3/UL (ref 150–450)
POTASSIUM SERPL-SCNC: 3.5 MMOL/L (ref 3.4–5.3)
RBC # BLD AUTO: 3.93 10E6/UL (ref 3.8–5.2)
SODIUM SERPL-SCNC: 142 MMOL/L (ref 135–145)
WBC # BLD AUTO: 7.2 10E3/UL (ref 4–11)

## 2023-10-16 PROCEDURE — 33285 INSJ SUBQ CAR RHYTHM MNTR: CPT | Performed by: INTERNAL MEDICINE

## 2023-10-16 PROCEDURE — 999N000071 HC STATISTIC HEART CATH LAB OR EP LAB

## 2023-10-16 PROCEDURE — 36415 COLL VENOUS BLD VENIPUNCTURE: CPT | Performed by: INTERNAL MEDICINE

## 2023-10-16 PROCEDURE — 250N000009 HC RX 250: Performed by: INTERNAL MEDICINE

## 2023-10-16 PROCEDURE — C1764 EVENT RECORDER, CARDIAC: HCPCS | Performed by: INTERNAL MEDICINE

## 2023-10-16 PROCEDURE — 80048 BASIC METABOLIC PNL TOTAL CA: CPT | Performed by: INTERNAL MEDICINE

## 2023-10-16 PROCEDURE — 85027 COMPLETE CBC AUTOMATED: CPT | Performed by: INTERNAL MEDICINE

## 2023-10-16 DEVICE — MONITOR CARDIAC LUX DX INSERTABLE M301: Type: IMPLANTABLE DEVICE | Status: FUNCTIONAL

## 2023-10-16 RX ORDER — NALOXONE HYDROCHLORIDE 0.4 MG/ML
0.2 INJECTION, SOLUTION INTRAMUSCULAR; INTRAVENOUS; SUBCUTANEOUS
Status: DISCONTINUED | OUTPATIENT
Start: 2023-10-16 | End: 2023-10-16 | Stop reason: HOSPADM

## 2023-10-16 RX ORDER — NALOXONE HYDROCHLORIDE 0.4 MG/ML
0.4 INJECTION, SOLUTION INTRAMUSCULAR; INTRAVENOUS; SUBCUTANEOUS
Status: DISCONTINUED | OUTPATIENT
Start: 2023-10-16 | End: 2023-10-16 | Stop reason: HOSPADM

## 2023-10-16 RX ORDER — ACETAMINOPHEN 325 MG/1
650 TABLET ORAL EVERY 4 HOURS PRN
Status: DISCONTINUED | OUTPATIENT
Start: 2023-10-16 | End: 2023-10-16 | Stop reason: HOSPADM

## 2023-10-16 RX ORDER — LIDOCAINE 40 MG/G
CREAM TOPICAL
Status: DISCONTINUED | OUTPATIENT
Start: 2023-10-16 | End: 2023-10-16 | Stop reason: HOSPADM

## 2023-10-16 ASSESSMENT — ACTIVITIES OF DAILY LIVING (ADL): ADLS_ACUITY_SCORE: 35

## 2023-10-16 NOTE — PRE-PROCEDURE
GENERAL PRE-PROCEDURE:   Procedure:  Cardiac monitor implantation  Date/Time:  10/16/2023 3:42 PM    Written consent obtained?: Yes    Risks and benefits: Risks, benefits and alternatives were discussed    Consent given by:  Patient  Patient states understanding of procedure being performed: Yes    Patient's understanding of procedure matches consent: Yes    Procedure consent matches procedure scheduled: Yes    Appropriately NPO:  Yes  ASA Class:  1  Mallampati  :  Grade 1- soft palate, uvula, tonsillar pillars, and posterior pharyngeal wall visible  Lungs:  Lungs clear with good breath sounds bilaterally  Heart:  Normal heart sounds and rate  History & Physical reviewed:  History and physical reviewed and no updates needed  Statement of review:  I have reviewed the lab findings, diagnostic data, medications, and the plan for sedation

## 2023-10-16 NOTE — PROGRESS NOTES
[facility] Discharge Nursing Note    Patient Information  Name: Lauren Calvo  Age: 52 year old    Discharge Education:  Discharge instructions reviewed: Yes  Additional education/resources provided: device education and equipment given by device rep  Patient/patient representative verbalizes understanding: Yes  Patient discharging on new medications: No  Medication education completed: N/A    Discharge Plans:   Discharge location: home  Discharge ride contacted: N/A, no sedation used, driving self  Approximate discharge time: 1421    Discharge Criteria:  Discharge criteria met and vital signs stable: Yes    Patient Belongs:  Patient belongings returned to patient: Yes    Eleanor Quiñonez RN

## 2023-10-16 NOTE — DISCHARGE INSTRUCTIONS
Loop Recorder Discharge Instructions    After you go home:    You may resume your normal diet.    Care of Chest Incision:    Keep the bandage on for 3 days. You may remove the dressing on Thursday, October 19, 2023. Change it only if it gets loose or soaked. If you need to change it, use 4x4-inch gauze and a large clear bandage.   Leave the strips of tape on. They will fall off on their own, or we will remove them at your first check-up.  Check your wound daily for signs of infection, such as increased redness, severe swelling or draining. Fever may also be a sign of infection. Call us if you see any of these signs.  If there are no signs of infection, you may shower after the bandage comes off in 3 days. If you take a tub bath, keep the wound dry.  No soaking the incision (swimming pool, bathtub, hot tub) for 2 weeks.  You may have mild to medium pain for 3 to 5 days. Take Acetaminophen (Tylenol) or Ibuprofen (Advil) for the pain. If the pain persists or is severe, call us.    Activity:    Avoid strenuous activity until incision well healed.    Bleeding:    If you start bleeding from the incision site, sit down and press firmly on the site for 10 minutes.   Once bleeding stops, call [facility] as soon as you can.       Call 911 right away if you have heavy bleeding or bleeding that does not stop.      Medicines:    Take your medications, including blood thinners, unless your provider tells you not to.  If you have stopped any medicines, check with your provider about when to restart them.    Follow Up Appointments:    Follow up with Device Clinic at [facility] of patient preference in 7-10 days.    Call the clinic if:    You have a large or growing hard lump around the site.  The site is red, swollen, hot or tender.  Blood or fluid is draining from the site.  You have chills or a fever greater than 101 F (38 C).  You feel dizzy or light-headed.  Questions or concerns.    Telling others about your  device:    Before you leave the hospital, you will receive a temporary ID card. A permanent card will be mailed to you about 6 to 8 weeks later. Always carry the ID card with you. It has important details about your device.  You may also get a Medical Alert bracelet or tag that says you have a loop recorder.  Go to [email or web address].   Always tell doctors, dentists and other care providers that you have a device implanted in you.  Let us know before you plan any surgeries. Your care team must take special steps to keep you safe during certain procedures. These steps will depend on the type of device you have. Your provider will need to see your ID card. They may need to call us for instructions.    Device Safety:    Please refer to device  s booklet for further information.        [facility] [place]:    [phone] UM (7 days a week)

## 2023-10-16 NOTE — PROGRESS NOTES
Care Suites Admission Nursing Note    Patient Information  Name: Lauren Calvo  Age: 52 year old  Reason for admission: loop recorder placement  Care Suites arrival time: 1415     Patient Admission/Assessment   Pre-procedure assessment complete: Yes  If abnormal assessment/labs, provider notified: N/A  NPO: Yes  Medications held per instructions/orders: N/A  Consent: deferred  If applicable, pregnancy test status: deferred  Patient oriented to room: Yes  Education/questions answered: Yes  Plan/other: proceed as planned    Discharge Planning  Discharge name/phone number: self  Overnight post sedation caregiver: n/a  Discharge location: home    Eleanor Quiñonez RN

## 2023-10-16 NOTE — PROGRESS NOTES
Successful cardiac monitor implantation (JEDI MIND).    No apparent complication.  The patient will be discharged home following device teaching.  We will see her in the device clinic in 1 week.

## 2023-10-16 NOTE — PROGRESS NOTES
Care Suites Post Procedure Note    Patient Information  Name: Lauren Calvo  Age: 52 year old    Post Procedure  Time patient returned to Care Suites: 1602  Concerns/abnormal assessment: none  If abnormal assessment, provider notified: N/A  Plan/Other: VS and site monitoring, discharge home after device education completed by rep.    Eleanor Quiñonez RN

## 2023-10-17 ENCOUNTER — TELEPHONE (OUTPATIENT)
Dept: CARDIOLOGY | Facility: CLINIC | Age: 53
End: 2023-10-17
Payer: COMMERCIAL

## 2023-10-17 DIAGNOSIS — Z95.818 STATUS POST PLACEMENT OF IMPLANTABLE LOOP RECORDER: ICD-10-CM

## 2023-10-17 DIAGNOSIS — R55 SYNCOPE: Primary | ICD-10-CM

## 2023-10-17 NOTE — TELEPHONE ENCOUNTER
Pt had ILR implanted yesterday for syncope.     Post device implant discharge phone call.    Reviewed the following:  - Remove outer dressing 3 days after implant. May shower after outer dressing removed.   - Leave steri-strips in place, will be removed at 1 week device check  - Watch for redness, drainage, warmth, or fever. Call device clinic if any signs of infection.     1 week device check scheduled: 10/24/2023, remote ILR check, and pt will send in photo. Gave pt email address to send photo, instructed to remove steri strips before taking the photo. Pt will be traveling during this time, so she is not expecting a call at a certain time, can try calling if needed though     Pt states understanding of all instructions.

## 2023-10-23 ENCOUNTER — TELEPHONE (OUTPATIENT)
Dept: CARDIOLOGY | Facility: CLINIC | Age: 53
End: 2023-10-23
Payer: COMMERCIAL

## 2023-10-23 DIAGNOSIS — I44.2 COMPLETE HEART BLOCK (H): Primary | ICD-10-CM

## 2023-10-23 NOTE — TELEPHONE ENCOUNTER
Per Dr. 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 Please schedule and I'll see her in the caresuites. Thanks qp       Orders entered for PPM implant for CHB. No H&P orders, had H&P before her loop procedure last week. Note sent to .     Called pt but no answer to discuss symptoms or recommendations. SH/RN

## 2023-10-23 NOTE — TELEPHONE ENCOUNTER
Alert received for pause episode on [employer] loop recorder today. Device implanted for syncope.     Episode on 10/21/23 at 20:02: EGM shows clear p waves with out Ventricular conduction following suggesting CHB.       Will route to EP MD of the day for further review after assessing for symptoms. Awaiting call back.

## 2023-10-24 ENCOUNTER — TELEPHONE (OUTPATIENT)
Dept: CARDIOLOGY | Facility: CLINIC | Age: 53
End: 2023-10-24

## 2023-10-24 ENCOUNTER — MYC MEDICAL ADVICE (OUTPATIENT)
Dept: CARDIOLOGY | Facility: CLINIC | Age: 53
End: 2023-10-24

## 2023-10-24 ENCOUNTER — ANCILLARY PROCEDURE (OUTPATIENT)
Dept: CARDIOLOGY | Facility: CLINIC | Age: 53
End: 2023-10-24
Attending: INTERNAL MEDICINE
Payer: COMMERCIAL

## 2023-10-24 DIAGNOSIS — R55 SYNCOPE: ICD-10-CM

## 2023-10-24 DIAGNOSIS — Z95.818 STATUS POST PLACEMENT OF IMPLANTABLE LOOP RECORDER: ICD-10-CM

## 2023-10-24 PROCEDURE — G2066 INTER DEVC REMOTE 30D: HCPCS | Performed by: INTERNAL MEDICINE

## 2023-10-24 PROCEDURE — 93297 REM INTERROG DEV EVAL ICPMS: CPT | Performed by: INTERNAL MEDICINE

## 2023-10-24 NOTE — TELEPHONE ENCOUNTER
"Pt had [employer] M301 LUX-Dx ICM 10/16/23 for Syncope and collapse.     Per 1 week check noted 2 pause episodes lasting 3.3-3.7s long during possible waking hours(was unable to confirm with pt). One episode at 6:15am and the other at 8pm.  Per Dr. Pierson note on 9/27/23 \"Her Zio patch monitor did not show any explanation for syncope.  She did not capture an episode on the Zio patch monitor nor has she captured an episode during any previous monitoring session.\"     Pt did not press any symptom button during these episodes.                             "

## 2023-10-25 NOTE — TELEPHONE ENCOUNTER
I called the patient to have a discussion about these episodes recorded on her loop recorder.  I confirmed that these episodes occurred while she was awake.  She had a 3.3-second pause and a 3.7-second pause.  There was some heart rate slowing prior to the pause and the pauses appear to be due to complete heart block with nonconducted P waves.  I have recommended that she proceed with permanent pacemaker implantation because of the danger with possible injury during her syncopal events. She is unsure whether she wants to have a pacemaker implanted.  She is currently on a cruise from [place] to [place] and will contact me when she gets back to let me know whether she wishes to pursue the pacemaker option. Stevan Pierson MD

## 2023-10-25 NOTE — TELEPHONE ENCOUNTER
"I did implant the monitor but Dr. Pierson ordered it.  Please request Dr. Pierson's opinion on this, as he actually saw the pt twice and knows her history well.   From an EP standpoint, I see a \"moderate\" pause associated with both sinus rate slowing and AVB, c/w vasovagal mechanism.   DI  "

## 2023-10-25 NOTE — TELEPHONE ENCOUNTER
Messaged Dr. Pierson regarding loop implant findings. Awaiting review/reply. Purvi Clemente RN on 10/25/2023 at 4:15 PM

## 2023-10-26 LAB
MDC_IDC_EPISODE_DTM: NORMAL
MDC_IDC_EPISODE_DTM: NORMAL
MDC_IDC_EPISODE_DURATION: 4 S
MDC_IDC_EPISODE_ID: NORMAL
MDC_IDC_EPISODE_ID: NORMAL
MDC_IDC_EPISODE_TYPE: NORMAL
MDC_IDC_EPISODE_TYPE: NORMAL
MDC_IDC_EPISODE_VENDOR_TYPE: NORMAL
MDC_IDC_MSMT_BATTERY_DTM: NORMAL
MDC_IDC_MSMT_BATTERY_STATUS: NORMAL
MDC_IDC_PG_IMPLANT_DTM: NORMAL
MDC_IDC_PG_MFG: NORMAL
MDC_IDC_PG_MODEL: NORMAL
MDC_IDC_PG_SERIAL: NORMAL
MDC_IDC_PG_TYPE: NORMAL
MDC_IDC_SESS_CLINIC_NAME: NORMAL
MDC_IDC_SESS_DTM: NORMAL
MDC_IDC_SESS_TYPE: NORMAL
MDC_IDC_STAT_AT_BURDEN_PERCENT: 0 %
MDC_IDC_STAT_AT_DTM_END: NORMAL
MDC_IDC_STAT_AT_DTM_START: NORMAL

## 2023-10-26 NOTE — TELEPHONE ENCOUNTER
Another message left on both home and cell numbers to inform pt of findings and review recommendations. LM that there was something on her recent transmission that we would like to discuss and go over MD recommendations. Gave direct number for call back. SH/RN

## 2023-10-26 NOTE — TELEPHONE ENCOUNTER
Reminder forwarded to team board for a week from now to call pt to see whether she returned from her vacation and has decided on PPM. Purvi Clemente RN on 10/26/2023 at 7:59 AM

## 2023-10-26 NOTE — TELEPHONE ENCOUNTER
Per previous encounter, pt is on a cruise and will be coming back in about 1 week.  She did get the chance to talk to Dr. Pierson and Dr. Pierson updated her in regards to Dr. Wiggins recommendations.     She is unsure if she wants to have a ppm, but she will let us know what she decides when she gets back from her cruise.

## 2023-11-01 NOTE — TELEPHONE ENCOUNTER
Pt  left VM, said she is returning a call. Per previous notes, she had daytime pauses seen on her ILR, Dr. Huerta and Dr. Pierson have recommended PPM implant, pt was unsure and wanted to think about it. Pt did speak with Dr. Pierson on the phone, see note from 10/25/2023.     Called pt back. She explained that she knows about the 2 pauses on October 21st and 23rd, and she spoke with Dr. Pierson on the phone about recommending the pacemaker implant, but she was surprised to hear from Dr. Pierson and wasn't prepared with any questions. She says she has syncopal events about twice a year, so she wasn't expecting any episodes to be recorded on her loop recorder so soon, plus she didn't have any symptoms. She is trying to wrap her brain around having a pacemaker for the rest of her life.     Pt said she searched online and seems to understand that 3 second pauses are not really a big deal. So she's wondering why they are recommending a PPM for 3 second pauses. I told pt that in most cases a 3 second pause with no symptoms is not a big deal (for example if we were to see a 3 second pause on a heart monitor that was placed for a different reason we would not be very concerned). But in her case, given her history of syncope, we look at things a little differently, knowing her heart does briefly to into CHB, we assume she could have a longer episode that would cause syncope.     Pt asked if the PPM was really necessary, she has had these episodes about twice a year for many years. She did get injured the most recent time and required stitches, but she feels she can take precautions to prevent that (put padding or bumpers in the bathroom). I told pt that we have to think about all scenarios, such as if she was driving, or up on a ladder, or climbing stairs, she could get very severely injured or injure someone else if she had an episode at that time. Pt states understanding, but she says she has only ever had an episode at night.      Pt says she feels fine 99% of the time, she has no fatigue, no dizziness, she exercises and is super active. She wouldn't want to implant a device that wasn't really necessary and end up with an infection or some other complication. I told pt this is understandable, but given her history and risk factors, the doctors believe it is more risky to not implant the PPM.     I suggested to pt that even though Dr. Huerta said he'd implant the PPM with a bedside H&P in Care Suites, I think we should schedule an OV with EP MD for pt to discuss her concerns. Pt agrees with this plan. I told pt that our EP MDs are scheduling a few months out right now, so we should get something on the schedule now. And in the meantime we will continue to monitor her ILR. Pt asked about the recommendations of Dr. Aragon and Dr. Huerta because both MDs reviewed her pauses. I told her Dr. Huerta wanted to go ahead with the PPM right away, and Dr. Aragon also recommended a PPM but wanted Dr. Pierson's input as well because Dr. Pierson knows pt the best. Pt said for this reason she would like to see Dr. Aragon for the OV because she wants to discuss both options and doesn't want to rush into a pacemaker.     Entered order for OV with Dr. Aragon, and sent message to scheduling to call pt and set up OV. This was over a 20 minute phone conversation, all her questions were answered.     ADDENDUM 11/2/2023: scheduling is calling pt today to set up OV with Dr. Aragon to discuss PPM. Will send update to Dr. Pierson.

## 2023-11-02 NOTE — TELEPHONE ENCOUNTER
I have reviewed the notes regarding the discussion with Ms. Calvo about her loop recorder episodes and her decision to discuss this further with Dr. Aragon before considering a permanent pacemaker implantation.  That seems reasonable, although I would still encourage her to proceed with the pacemaker implantation.  There is no need to contact the patient about episodes of sinus tachycardia or SVT.  I do not want to burden her with additional unnecessary information.  Stevan Pierson MD

## 2023-11-03 NOTE — TELEPHONE ENCOUNTER
Received below response from Dr. Aragon.  Pt is scheduled to follow-up with Dr. Aragon on 4/10/24.  Per appointment notes, patient will be out of state for the winter.      Called and updated patient on Dr. Pierson's response.      Pt wanted to know if she had had any episodes since the last pause on 10/23.  Explained no further pauses were logged.      Pt again expressed hesitation to have the PPM put in until there was more evidence that it was linked to her syncopal episodes.  Reviewed pause episodes and explained that these along with her syncopal history are concerning and give evidence that there is an electrical pathway issue with her heart that may have caused pauses at the time of the syncopal episodes as well.  Explained the concern is that she may have more of these (or longer ones) and either hurt herself or others if she were doing something like driving.      She verbalized understanding and stated that, since she has been dealing with these since she was 14, she is having trouble feeling like they are that big of a deal.  She states she is very active and runs on M,W,F (confirmed that the tachy epsiodes on 11/1/23 from [id number] were during her run).  Reviewed post-op activity restrictions that are generally recommended and explained that people are often able to go back to their normal exercise routine afterward.      Reassured patient that the decision is ultimately hers and that we are relaying the doctor recommendations and trying to make sure she has all the information to make an informed decision.  Stated if she does want to wait until April to see Mahesh, that I would recommend she be prepared to follow-up out of state if she has further episodes between now and then (of course hoping this would not be an issue).  Also offered to reach out to our  to see if there is any opportunity to get her in to see EP prior to her leaving at the end of December.  Patient was appreciative of this offer.   Message sent to Device , Elsa, to see if there are any earlier openings.     MARGARETTE Rondon     2167 Addendum:  Elsa stated there no MD openings before the end of the year, but there were some ELIEL openings.  Called and spoke with patient who would be interested in meeting with an ELIEL to review the PPM procedure and recommendations.  Notified Elsa who will reach out to patient to schedule.  MARGARETTE Rondon

## 2023-11-06 ENCOUNTER — TELEPHONE (OUTPATIENT)
Dept: CARDIOLOGY | Facility: CLINIC | Age: 53
End: 2023-11-06
Payer: COMMERCIAL

## 2023-11-06 DIAGNOSIS — R55 SYNCOPE: Primary | ICD-10-CM

## 2023-11-06 DIAGNOSIS — I44.2 COMPLETE HEART BLOCK (H): ICD-10-CM

## 2023-11-06 DIAGNOSIS — R55 VASOVAGAL SYNCOPE: ICD-10-CM

## 2023-11-06 NOTE — TELEPHONE ENCOUNTER
"Remote alert received for pause episode on 23 at 1212 (1112 with return to standard time).  EGM shows SR in the 80's with a 3.3 second pause/period of complete heart block (p-waves present) followed shortly after by a 2 second pause.      Patient had loop recorder implanted for syncope on 10/16/23.  She has had two other pauses logged on 10/21 and 10/23 (lasting 3.3-3.7 seconds).  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 Please schedule and I'll see her in the caresuites. Thanks qp \"    Dr. Pierson was also notified of the pauses and recommended PPM implant.      Patient was surprised at this recommendation as she has had the syncopal episodes since she was 14 years old and had previously been told it was not related to her heart.  Extensive conversations were had with her (see encounter from 10/23/23).  Patient is scheduled on 23 to review her questions with Rosemary Bhakta CNP.      Called and updated patient on most recent pause episode.  She was asymptomatic with episode.  Patient would like to continue monitoring frequency at this time as she is concerned about the potential lifestyle changes and the risk of complications from the procedure (she had infection post  when younger).     She asked if they really thought that this rhythm may have caused her syncopal episodes.  Confirmed that with her history and the types of pauses we are seeing there is strong suspicion/evidence that this may be contributing.  She asked if the main reason for recommending is for risk of injury, stating that since she has been living with this her whole life that she is used to it.  Confirmed that this was a concern - both injury to herself or injury to others if it occurred while she was driving.      Asked patient if she wanted to tentatively schedule a PPM for after her appointment with Rosemary and then " she could cancel this if she did not want to proceed.  She stated she is not sure at this time.  Will place order for PPM and patient will call Device , Elsa, if she would like to schedule the PPM.  She is aware that this needs to be scheduled at least 2 weeks in advance per hospital protocol.      Patient asked if she would be able to have pictures of the pause episodes to bring with to her primary care provider visit in a couple of weeks.  Pictures of pause episodes sent to patient via [employer].      MARGARETTE Rondon

## 2023-11-08 NOTE — TELEPHONE ENCOUNTER
Pt left VM, she requested a call back from June RN. Pt did not say what she needed.     Attempted to call pt back, no answer, left brief VM that June RN is with patients all afternoon, but anyone from the device team can help her, so please call back to device clinic with her questions/concerns.

## 2023-11-08 NOTE — TELEPHONE ENCOUNTER
Pt left another . I returned her call and spoke with her. Pt said she and her  have been really trying to dig in and remember what she was doing the 3 times she had pauses so far. She was able to pinpoint all 3 episodes based on photos and receipts, etc.     Background: pt has had syncope for years, usually about 2 episodes per year, and always at night, associated with nausea/vomitting. She recently had ILR implanted, and has had 3 pause episodes, all during the day, 3.3-3.7 seconds in duration, all asymptomatic. PPM was recommended, but pt is hesitant as she feels she doesn't really need it and is worried about potential complications.      Discussion: Pt says she was eating all three times she had a pause. Pt has a history of Schatzki's Ring where an area of her esophagus gets narrow. She has had to have her esophagus stretched several times. She sometimes has trouble swallowing, especially with certain foods.     Pt said with the pauses on 10/21 and 11/5 she was eating crackers. These are dry crackers that sometimes cause difficulty swallowing. Interestingly, for the time on 11/5 she said she remembers she had difficulty swallowing so she drank some water, but it didn't quite clear, so she got up and got some more water and then drank it and it completely cleared. She wonders if this coincides with the 3 second pause at first, followed by a 2 second pause approximately 20 seconds later. For the pause on 10/23 she was eating bread, she said she usually doesn't eat much bread because it commonly causes swallowing difficulties, but she remembers eating some on 10/23 and having some difficulty. Pt did some googling and wonders if what she has is called swallow syncope.     I told pt that difficulty swallowing could cause a vagal response that could slow the heart rate, so this definitely could be related. I told her I was unsure if this would change the recommendation for the PPM or not. Perhaps she does  have an underlying conduction problem that is exacerbated by difficulty swallowing, or perhaps the difficulty swallowing is the primary cause? Also unsure if the syncope at night is related to the pauses during the day, or maybe that's just a separate issue? Her syncopal episodes at night always involve nausea, could nausea cause the same response as difficulty swallowing? I told pt I will review this with MDs and let her know what they think.     Dr. Pierson is pt's primary cardiologist. Dr. Aragon did pt's ILR implant and has reviewed her pauses and requested Dr Pierson's opinion (10/25/2023 8:31am note), then Dr. Pierson recommended a PPM implant (10/25/2023 5:14pm note). Pt has upcoming OV with Dr. Aragon in 4/2024 and Rosemary VOSS in 12/2023, both of these OVs are to discuss PPM implant. Will review with Dr. Aragon.

## 2023-11-09 NOTE — TELEPHONE ENCOUNTER
Per Dr. Aragon's routing comment:   Thank you for your detailed note.  Even though I implanted the ILR, I really do not know the patient's history all that well.  Dr. Pierson does...  She is raising good points.  Please arrange for a clinic visit with me.  We should not implant PM before further discussion.       Pt already has OV with Dr. Aragon on 4/12/2024 to discuss PPM, 5 months out due to availability. Pt also has OV with Rosemary VOSS on 12/13/2023.      Will call pt later today with Dr. Aragon's message above.       ADDENDUM 10:45AM. Called and spoke with pt. Explained that Dr. Aragon recommends an OV to discuss all her information, he doesn't know her detailed history and this is too much to discuss over the phone. Pt asked if Dr. Aragon had heard of swallow syncope and if he thinks that is what she has. I told her he did not mention that, but did say she had brought up a lot of good points that should be discussed.     Will keep the current plan for OV with Rosemary VOSS in December and OV with Dr. Aragon in April. Will send a message to Rosemary VOSS so she has all the background information before her OV in December. Based on recommendation at that OV, can either proceed with PPM at that time, or continue to monitor and discuss again with Dr. Aragon in April.     I told pt that this isn't urgent right now, but if anything changes (like if she has a syncope episode with correlating arrhythmias/pastora/pause on her ILR) then we can reevaluate the plan at that time. Pt agrees with this plan.

## 2023-11-09 NOTE — TELEPHONE ENCOUNTER
Per Dr. Aragon's routing comments:   Hi Fatuma, I think this is too much for Rosemary, let me look and create a clinic spot for her.  I should see her....  D   Lets try for a clinic appointment next Tuesday at 8:45, if that works for the patient...  D     Called pt. Her  answered the phone, pt was not available. I gave him brief information. He said that pt should be available Tuesday 11/14 at 8:45am, but he will check with her and have her call us to confirm. Gave him device clinic phone number.     Will ask scheduling to put pt on the schedule.

## 2023-11-14 ENCOUNTER — OFFICE VISIT (OUTPATIENT)
Dept: CARDIOLOGY | Facility: CLINIC | Age: 53
End: 2023-11-14
Payer: COMMERCIAL

## 2023-11-14 VITALS
HEIGHT: 62 IN | SYSTOLIC BLOOD PRESSURE: 149 MMHG | BODY MASS INDEX: 26.13 KG/M2 | HEART RATE: 60 BPM | WEIGHT: 142 LBS | DIASTOLIC BLOOD PRESSURE: 83 MMHG | OXYGEN SATURATION: 100 %

## 2023-11-14 DIAGNOSIS — I44.2 COMPLETE HEART BLOCK (H): ICD-10-CM

## 2023-11-14 DIAGNOSIS — R55 VASOVAGAL SYNCOPE: Primary | ICD-10-CM

## 2023-11-14 PROCEDURE — 99214 OFFICE O/P EST MOD 30 MIN: CPT | Performed by: INTERNAL MEDICINE

## 2023-11-14 RX ORDER — RIBOFLAVIN (VITAMIN B2) 100 MG
1000 TABLET ORAL DAILY
COMMUNITY

## 2023-11-14 RX ORDER — ZINC GLUCONATE 50 MG
50 TABLET ORAL DAILY
COMMUNITY

## 2023-11-14 NOTE — LETTER
11/14/2023    Rip Hays MD  [address] [place]  [place] [address]    RE: Lauren Calvo       Dear Colleague,     I had the pleasure of seeing Lauren Calvo in the [facility].  PHYSICIAN NOTE:  This visit was completed in person at the [facility].      I had the pleasure of seeing Ms. Lauren Calvo for evaluation of bradycardia/syncope. She has been referred to EP by Dr. Stevan Pierson.    The patient is a delightful 52-year-old female with history of fainting spells since age 14. The vast majority have occurred in the middle of the night. Patient typically wakes up in the morning feeling sick in her stomach. Sometimes there is preceding sharp abdominal discomfort. Seconds later, after she gets up, she faints. During a recent event she hit her head on the floor and suffered a laceration on her forehead. Following the event she typically feels dazed for a short while.    On average, the patient has had two fainting spells per year. She recalls of only one event during the day which happened shortly after swallowing some vitamin pills.    She saw Dr. Pierson earlier this year. Stevan suspected vasovagal syncope and recommended an implantable cardiac monitor. I implanted the device on 10/16/2023.    Subsequently, low-grade pauses during the day, 3 to 3.3 seconds were documented. In reviewing these tracings there is simultaneous PP interval prolongation and AV block, a classic combination for neurally-mediated events. Today the patient tells me that at the time of these events she was eating crackers, had a feeling they were stuck and experienced a brief choking sensation. She has history of Schatzki's ring of the esophagus and previously underwent esophageal dilatation. She has not seen GI since 2016.    The patient exercises regularly without chest pain or other cardiac symptoms. She is a non-smoker and drinks alcohol socially. She lives [place]  in  [place]. No family history of early onset heart disease.      PHYSICAL EXAMINATION:  Vital signs: 149/83, 60, 64.4 kg, BMI 26  General: very pleasant, healthy appearing, in no apparent distress  ENT/Mouth:  no nasal discharge.  Eyes:  normal conjunctivae.   Neck:  no thyromegaly or lymphadenopathy.  Chest/Lungs:  patient is not dyspneic.  Lungs CTA, without rales or wheezing  Cardiovascular: normal JVP, rhythm is regular.  No gallop, murmur or rub.    Abdomen:  no abdominal distention.     Extremities:  no edema  Skin:  no xanthelasma.    Neurologic:  alert & oriented x 3.  No tremor.    Vascular:  2+ carotids without bruits.  2+ radials.        DIAGNOSTIC STUDIES:  Laboratory studies: sodium 142, potassium 3.5, creatinine 0.67, hematocrit 37%  ECG: sinus rhythm, [facility]  Cardiac monitor  (9/2023, [facility]): unremarkable, had 10 beat run SVT      IMPRESSION:  Neurally-mediated (vasovagal) syncope. Classic history. Triggers for events are usually GI.  I had a good discussion with Ms. Calvo. We reviewed the pathophysiology of NMS and discussed treatment. Treatment involves avoidance of triggers, to the extent this is possible, good hydration and on occasion pharmacologic therapy. In carefully selected cases pacemaker implantation can be helpful, specifically in individuals with profound cardio-inhibitory response. So far, we have not documented profound bradycardia in this patient.  Ms. Calvo is reluctant to undergo pacemaker implantation. In my opinion, continuing to monitor is reasonable. Unless we document profound bradycardia/asystole, pacemaker implantation may not be helpful. I will emphasize that individuals with NMS can faint with pacemakers in place because of concomitant vasodilatation/hypotension.    RECOMMENDATIONS:  I asked the patient to stay in bed when she has her nausea events that wake her up at night. Keep a bin next to her bed in case she gets sick.  Continue to monitor via ILR.  Consider  pacemaker implantation if profound bradycardia/asystole is demonstrated.    The patient had several questions, all answered to the best of my ability.    It was my pleasure seeing this delightful patient.  Please feel free to call with any questions.     Fátima Aragon MD, [facility]      (Chart documentation was completed, in part, using Dragon voice-recognition software. The note was reviewed, however grammatical and spelling errors may be present.)      CURRENT MEDICATIONS:  Current Outpatient Medications   Medication Sig Dispense Refill    Cyanocobalamin (B-12 PO) Take by mouth daily      MAGNESIUM CITRATE PO Take by mouth daily      Multiple Vitamins-Minerals (ZINC PO) Take by mouth daily      vitamin C (ASCORBIC ACID) 100 MG tablet Take 1,000 mg by mouth daily      Vitamins-Lipotropics (LIPOGEN OR) Take by mouth daily      zinc gluconate 50 MG tablet Take 50 mg by mouth daily      UNABLE TO FIND 3 times daily MEDICATION NAME: Metagest      UNABLE TO FIND daily MEDICATION NAME: Cardiogenics      UNABLE TO FIND daily MEDICATION NAME: Omega Pure EPA-      UNABLE TO FIND daily MEDICATION NAME: Cole Meriva Curcumin Phytosome      UNABLE TO FIND daily MEDICATION NAME: Metabolic Synergy         ALLERGIES     Allergies   Allergen Reactions    Compazine [Prochlorperazine] Shortness Of Breath    Egg White [Chicken-Derived Products (Egg)]      Tested positive to eggs allergy     Midrin [Isometheptene-Apap-Dichloral] Hives       PAST MEDICAL HISTORY:  History reviewed. No pertinent past medical history.    PAST SURGICAL HISTORY:  Past Surgical History:   Procedure Laterality Date    EP LOOP RECORDER IMPLANT N/A 10/16/2023    Procedure: Loop Recorder Implant;  Surgeon: Fátima Aragon MD;  Location:  HEART CARDIAC CATH LAB    GYN SURGERY      3 C-sections       FAMILY HISTORY:  Family History   Problem Relation Age of Onset    Hypertension Mother     Other Cancer Father         bladder        SOCIAL  "HISTORY:  Social History     Socioeconomic History    Marital status:      Spouse name: None    Number of children: None    Years of education: None    Highest education level: None   Tobacco Use    Smoking status: Never    Smokeless tobacco: Never   Vaping Use    Vaping Use: Never used   Substance and Sexual Activity    Alcohol use: Yes     Comment: socially    Drug use: No    Sexual activity: Yes     Partners: Male       Review of Systems:  Skin:          Eyes:         ENT:         Respiratory:  Negative       Cardiovascular:  Negative for;chest pain;palpitations;lightheadedness;dizziness;edema;syncope or near-syncope;fatigue      Gastroenterology:        Genitourinary:         Musculoskeletal:         Neurologic:         Psychiatric:         Heme/Lymph/Imm:  Negative      Endocrine:  Negative        Physical Exam:  Vitals: BP (!) 149/83   Pulse 60   Ht 1.575 m (5' 2\")   Wt 64.4 kg (142 lb)   SpO2 100%   BMI 25.97 kg/m      Constitutional:           Skin:             Head:           Eyes:           Lymph:      ENT:           Neck:           Respiratory:            Cardiac:                                                           GI:           Extremities and Muscular Skeletal:                 Neurological:           Psych:           CC  No referring provider defined for this encounter.    Thank you for allowing me to participate in the care of your patient.      Sincerely,     Fátima Aragon MD     [facility]  "

## 2023-11-14 NOTE — PROGRESS NOTES
PHYSICIAN NOTE:  This visit was completed in person at the [facility].      I had the pleasure of seeing Ms. Lauren Calvo for evaluation of bradycardia/syncope. She has been referred to EP by Dr. Stevan Pierson.    The patient is a delightful 52-year-old female with history of fainting spells since age 14. The vast majority have occurred in the middle of the night. Patient typically wakes up in the morning feeling sick in her stomach. Sometimes there is preceding sharp abdominal discomfort. Seconds later, after she gets up, she faints. During a recent event she hit her head on the floor and suffered a laceration on her forehead. Following the event she typically feels dazed for a short while.    On average, the patient has had two fainting spells per year. She recalls of only one event during the day which happened shortly after swallowing some vitamin pills.    She saw Dr. Pierson earlier this year. Stevan suspected vasovagal syncope and recommended an implantable cardiac monitor. I implanted the device on 10/16/2023.    Subsequently, low-grade pauses during the day, 3 to 3.3 seconds were documented. In reviewing these tracings there is simultaneous PP interval prolongation and AV block, a classic combination for neurally-mediated events. Today the patient tells me that at the time of these events she was eating crackers, had a feeling they were stuck and experienced a brief choking sensation. She has history of Schatzki's ring of the esophagus and previously underwent esophageal dilatation. She has not seen GI since 2016.    The patient exercises regularly without chest pain or other cardiac symptoms. She is a non-smoker and drinks alcohol socially. She lives with her  in [place]. No family history of early onset heart disease.      PHYSICAL EXAMINATION:  Vital signs: 149/83, 60, 64.4 kg, BMI 26  General: very pleasant, healthy appearing, in no apparent distress  ENT/Mouth:  no nasal  discharge.  Eyes:  normal conjunctivae.   Neck:  no thyromegaly or lymphadenopathy.  Chest/Lungs:  patient is not dyspneic.  Lungs CTA, without rales or wheezing  Cardiovascular: normal JVP, rhythm is regular.  No gallop, murmur or rub.    Abdomen:  no abdominal distention.     Extremities:  no edema  Skin:  no xanthelasma.    Neurologic:  alert & oriented x 3.  No tremor.    Vascular:  2+ carotids without bruits.  2+ radials.        DIAGNOSTIC STUDIES:  Laboratory studies: sodium 142, potassium 3.5, creatinine 0.67, hematocrit 37%  ECG: sinus rhythm, [facility]  Cardiac monitor  (9/2023, [facility]): unremarkable, had 10 beat run SVT      IMPRESSION:  Neurally-mediated (vasovagal) syncope. Classic history. Triggers for events are usually GI.  I had a good discussion with Ms. Calvo. We reviewed the pathophysiology of NMS and discussed treatment. Treatment involves avoidance of triggers, to the extent this is possible, good hydration and on occasion pharmacologic therapy. In carefully selected cases pacemaker implantation can be helpful, specifically in individuals with profound cardio-inhibitory response. So far, we have not documented profound bradycardia in this patient.  Ms. Calvo is reluctant to undergo pacemaker implantation. In my opinion, continuing to monitor is reasonable. Unless we document profound bradycardia/asystole, pacemaker implantation may not be helpful. I will emphasize that individuals with NMS can faint with pacemakers in place because of concomitant vasodilatation/hypotension.    RECOMMENDATIONS:  I asked the patient to stay in bed when she has her nausea events that wake her up at night. Keep a bin next to her bed in case she gets sick.  Continue to monitor via ILR.  Consider pacemaker implantation if profound bradycardia/asystole is demonstrated.    The patient had several questions, all answered to the best of my ability.    It was my pleasure seeing this delightful patient.  Please feel  free to call with any questions.     Fátima Aragon MD, [facility]      (Chart documentation was completed, in part, using Dragon voice-recognition software. The note was reviewed, however grammatical and spelling errors may be present.)      CURRENT MEDICATIONS:  Current Outpatient Medications   Medication Sig Dispense Refill    Cyanocobalamin (B-12 PO) Take by mouth daily      MAGNESIUM CITRATE PO Take by mouth daily      Multiple Vitamins-Minerals (ZINC PO) Take by mouth daily      vitamin C (ASCORBIC ACID) 100 MG tablet Take 1,000 mg by mouth daily      Vitamins-Lipotropics (LIPOGEN OR) Take by mouth daily      zinc gluconate 50 MG tablet Take 50 mg by mouth daily      UNABLE TO FIND 3 times daily MEDICATION NAME: Metagest      UNABLE TO FIND daily MEDICATION NAME: Cardiogenics      UNABLE TO FIND daily MEDICATION NAME: Omega Pure EPA-      UNABLE TO FIND daily MEDICATION NAME: Cole Meriva Curcumin Phytosome      UNABLE TO FIND daily MEDICATION NAME: Metabolic Synergy         ALLERGIES     Allergies   Allergen Reactions    Compazine [Prochlorperazine] Shortness Of Breath    Egg White [Chicken-Derived Products (Egg)]      Tested positive to eggs allergy     Midrin [Isometheptene-Apap-Dichloral] Hives       PAST MEDICAL HISTORY:  History reviewed. No pertinent past medical history.    PAST SURGICAL HISTORY:  Past Surgical History:   Procedure Laterality Date    EP LOOP RECORDER IMPLANT N/A 10/16/2023    Procedure: Loop Recorder Implant;  Surgeon: Fátima Aragon MD;  Location:  HEART CARDIAC CATH LAB    GYN SURGERY      3 C-sections       FAMILY HISTORY:  Family History   Problem Relation Age of Onset    Hypertension Mother     Other Cancer Father         bladder        SOCIAL HISTORY:  Social History     Socioeconomic History    Marital status:      Spouse name: None    Number of children: None    Years of education: None    Highest education level: None   Tobacco Use    Smoking  "status: Never    Smokeless tobacco: Never   Vaping Use    Vaping Use: Never used   Substance and Sexual Activity    Alcohol use: Yes     Comment: socially    Drug use: No    Sexual activity: Yes     Partners: Male       Review of Systems:  Skin:          Eyes:         ENT:         Respiratory:  Negative       Cardiovascular:  Negative for;chest pain;palpitations;lightheadedness;dizziness;edema;syncope or near-syncope;fatigue      Gastroenterology:        Genitourinary:         Musculoskeletal:         Neurologic:         Psychiatric:         Heme/Lymph/Imm:  Negative      Endocrine:  Negative        Physical Exam:  Vitals: BP (!) 149/83   Pulse 60   Ht 1.575 m (5' 2\")   Wt 64.4 kg (142 lb)   SpO2 100%   BMI 25.97 kg/m      Constitutional:           Skin:             Head:           Eyes:           Lymph:      ENT:           Neck:           Respiratory:            Cardiac:                                                           GI:           Extremities and Muscular Skeletal:                 Neurological:           Psych:           CC  No referring provider defined for this encounter.                "

## 2023-11-29 ENCOUNTER — TELEPHONE (OUTPATIENT)
Dept: CARDIOLOGY | Facility: CLINIC | Age: 53
End: 2023-11-29
Payer: COMMERCIAL

## 2023-11-29 NOTE — TELEPHONE ENCOUNTER
Remote alert received for a 2 second pause followed by a single beat and another 3.2 second pause episode on 11/28/23 at 1300.  These are known for patient and per Dr. Aragon on 11/14/23 we are monitoring for profound bradycardia episodes.      Called and left message for patient to see if she was awake and/or symptomatic with episode.  Device clinic phone number provided.     MARGARETTE Rondon       EGM for pause episode:

## 2023-12-01 NOTE — TELEPHONE ENCOUNTER
"Spoke with pt who states she was eating at the time and felt like she had \"food sticking \" in her throat. She did not gag or cough. She has noticed these episodes are happening when food gets stuck. Pt able to pass the food by just waiting til it passes.   Will route to Dr Aragon for review.  Ann PFEIFFER  "

## 2023-12-11 ENCOUNTER — TELEPHONE (OUTPATIENT)
Dept: CARDIOLOGY | Facility: CLINIC | Age: 53
End: 2023-12-11
Payer: COMMERCIAL

## 2023-12-11 NOTE — TELEPHONE ENCOUNTER
Remote alert received for two pause episodes.  EGMs both occurred on Saturday, 12/9/23 (one at 1958 and one at 2009).  EGMs show sinus pauses lasting 3-3.2 seconds.  These are not new for patient and are typically associated with GI symptoms.    Per Dr. Aragon on 11/14/23 we are monitoring for profound bradycardia episodes.       Called and left message for patient to see if she was awake and/or symptomatic with episode and if they were associated with any GI triggers.  Patient previously asked us to notify her with any paus episodes, also asked if she would continue to like to have this done or, since they are brief and known, if she would just like to be notified for longer episodes.  When patient calls back, can also ask her to call us if she has any symptoms and then we could see if they correlate with any logged episodes (rather than calling with all brief episodes).   Device clinic phone number provided.      MARGARETTE Rondon    12/11/23 1018 Addendum:  Received call back from patient who stated that she was eating at this time and even told her  that it felt like all the food didn't clear at 2010 on that evening.  This correlates with these episodes.      Reviewed how we normally would monitor these and review them with her at 3 month interval as long as the pauses were not longer (since we know she has these pauses).  If they were longer, we would contact her and also encouraged her to keep a log and contact us if she had symptoms.  Patient verbalized understanding and agreement with plan.  Will continue to monitor at this time as patient has known trigger. MARGARETTE Rondon       Pause EGM:           Second Pause EGM:

## 2024-01-15 ENCOUNTER — MYC MEDICAL ADVICE (OUTPATIENT)
Dept: CARDIOLOGY | Facility: CLINIC | Age: 54
End: 2024-01-15
Payer: COMMERCIAL

## 2024-01-16 NOTE — TELEPHONE ENCOUNTER
3.5s pause logged 1/15 at 1621:            GI triggered pause episodes are not a new finding for patient. Per Dr. Aragon on 11/14/23:        Called patient and LVM requesting a call back to Device RN line so we can further discuss episode.  ALONSO RN

## 2024-01-16 NOTE — TELEPHONE ENCOUNTER
Spoke with Lauren. She had some trouble swallowing yesterday. The food got stuck and she ended up vomiting to clear it. She figured something would show up on her monitor seeing as it always has in the past.  She denies any dizziness or syncopal episodes.  She knows to contact the clinic with any similar or new/troublesome symptoms.   We will continue to monitor.  ALONSO PFEIFFER

## 2024-02-06 ENCOUNTER — TELEPHONE (OUTPATIENT)
Dept: CARDIOLOGY | Facility: CLINIC | Age: 54
End: 2024-02-06
Payer: COMMERCIAL

## 2024-02-06 NOTE — TELEPHONE ENCOUNTER
Alert received from pt's loop recorder, implanted for syncopal episodes, for 3.5s pause.     EGM 2/5/24 at 1342, shows 3.5s of CHB (P waves noted with no QRS following).     Pt has known pause episodes and AV block. She has had an appt to discuss PPM placement, at this time per pt and MD continue to monitor. This episode was logged in the middle of the day.          Will call pt and assess for symptoms. MARGARETTE Castle

## 2024-02-12 ENCOUNTER — ANCILLARY PROCEDURE (OUTPATIENT)
Dept: CARDIOLOGY | Facility: CLINIC | Age: 54
End: 2024-02-12
Attending: INTERNAL MEDICINE
Payer: COMMERCIAL

## 2024-02-12 DIAGNOSIS — R55 SYNCOPE: ICD-10-CM

## 2024-02-12 DIAGNOSIS — Z95.818 STATUS POST PLACEMENT OF IMPLANTABLE LOOP RECORDER: ICD-10-CM

## 2024-02-12 PROCEDURE — 93298 REM INTERROG DEV EVAL SCRMS: CPT | Performed by: INTERNAL MEDICINE

## 2024-02-12 NOTE — TELEPHONE ENCOUNTER
3rd attempt. LM with direct number for symptoms. Per previous notes, pt has declined a PPM until she knows how frequent episodes are\. MARGARETTE Castle

## 2024-02-13 LAB
MDC_IDC_EPISODE_DTM: NORMAL
MDC_IDC_EPISODE_ID: NORMAL
MDC_IDC_EPISODE_TYPE: NORMAL
MDC_IDC_MSMT_BATTERY_DTM: NORMAL
MDC_IDC_MSMT_BATTERY_STATUS: NORMAL
MDC_IDC_PG_IMPLANT_DTM: NORMAL
MDC_IDC_PG_MFG: NORMAL
MDC_IDC_PG_MODEL: NORMAL
MDC_IDC_PG_SERIAL: NORMAL
MDC_IDC_PG_TYPE: NORMAL
MDC_IDC_SESS_CLINIC_NAME: NORMAL
MDC_IDC_SESS_DTM: NORMAL
MDC_IDC_SESS_TYPE: NORMAL
MDC_IDC_STAT_AT_BURDEN_PERCENT: 0 %
MDC_IDC_STAT_AT_DTM_END: NORMAL
MDC_IDC_STAT_AT_DTM_START: NORMAL

## 2024-03-06 ENCOUNTER — MYC MEDICAL ADVICE (OUTPATIENT)
Dept: CARDIOLOGY | Facility: CLINIC | Age: 54
End: 2024-03-06
Payer: COMMERCIAL

## 2024-03-07 ENCOUNTER — MYC MEDICAL ADVICE (OUTPATIENT)
Dept: CARDIOLOGY | Facility: CLINIC | Age: 54
End: 2024-03-07
Payer: COMMERCIAL

## 2024-03-14 ENCOUNTER — TELEPHONE (OUTPATIENT)
Dept: CARDIOLOGY | Facility: CLINIC | Age: 54
End: 2024-03-14
Payer: COMMERCIAL

## 2024-03-14 NOTE — TELEPHONE ENCOUNTER
Alert received from patient's PPM for 3 pause episodes: 3.3s pause on 3/2 at 1921, 3.4s pause on 3.8 at 1150, and 3.5s pause on 3.8 at 1154.  Not a new finding, typically triggered by a GI event. Patient has declined a PPM in the past. We are continuing to monitor closely.   Called patient. LVM requesting she call back so we can assess for symptoms/what she was doing when the episodes were logged.  ALONSO PFEIFFER

## 2024-03-18 NOTE — TELEPHONE ENCOUNTER
"Another alert received from patient's loop recorder for a 3.9s pause that occurred on 3/16/24 at 2027.    Spoke with patient. All of her episodes occurred while she was eating food. No dizziness, light headedness, or syncopal episodes.    We spoke extensively about episodes. Patient is not ready to pursue PPM seeing as she knows trigger and treatment for her episodes. She states she is waiting for the \"big event\" similar to what she had when she passed out and split open her head to determine what is happening during those episodes. Explained to patient that it is likely something to do with her heart rhythm based on the short pauses we have seen. PPM would remedy. She is looking forward to her appt with Dr. Pierson to discuss further.    ALONSO RN        "

## 2024-04-01 ENCOUNTER — TELEPHONE (OUTPATIENT)
Dept: CARDIOLOGY | Facility: CLINIC | Age: 54
End: 2024-04-01
Payer: COMMERCIAL

## 2024-04-01 NOTE — TELEPHONE ENCOUNTER
"  Another alert received from patient's loop recorder for a 4.0s pause that occurred on 3/29/24 at 19:01. This appears to be the longest episode recorded.     Spoke with patient. All of her episodes have occurred while she was eating food. No dizziness, light headedness, or syncopal episodes.     per 3/13/2024 entry:  We spoke extensively about episodes. Patient is not ready to pursue PPM seeing as she knows trigger and treatment for her episodes. She states she is waiting for the \"big event\" (similar to what she had when she passed out and split open her head) to determine what is happening during those episodes. Explained to patient that it is likely something to do with her heart rhythm based on the short pauses we have seen. PPM would remedy these pauses and hopefully prevent another syncopal episode like that. She is looking forward to her appt with Dr. Pierson to discuss further.  Pt is scheduled with Dr Pierson 8/22/2024.  [place] of [place]. She knows to call if she has symptoms.       Ann PFEIFFER  "

## 2024-05-14 ENCOUNTER — ANCILLARY PROCEDURE (OUTPATIENT)
Dept: CARDIOLOGY | Facility: CLINIC | Age: 54
End: 2024-05-14
Attending: INTERNAL MEDICINE
Payer: COMMERCIAL

## 2024-05-14 DIAGNOSIS — Z95.818 STATUS POST PLACEMENT OF IMPLANTABLE LOOP RECORDER: ICD-10-CM

## 2024-05-14 DIAGNOSIS — R55 SYNCOPE: ICD-10-CM

## 2024-05-14 PROCEDURE — 93298 REM INTERROG DEV EVAL SCRMS: CPT | Performed by: INTERNAL MEDICINE

## 2024-05-28 ENCOUNTER — LAB REQUISITION (OUTPATIENT)
Dept: LAB | Facility: CLINIC | Age: 54
End: 2024-05-28
Payer: COMMERCIAL

## 2024-05-28 DIAGNOSIS — G06.0 INTRACRANIAL ABSCESS AND GRANULOMA: ICD-10-CM

## 2024-05-28 LAB
ALT SERPL W P-5'-P-CCNC: 24 U/L (ref 0–50)
AST SERPL W P-5'-P-CCNC: 23 U/L (ref 0–45)
BASOPHILS # BLD AUTO: 0 10E3/UL (ref 0–0.2)
BASOPHILS NFR BLD AUTO: 1 %
BUN SERPL-MCNC: 8.9 MG/DL (ref 6–20)
CREAT SERPL-MCNC: 0.48 MG/DL (ref 0.51–0.95)
CRP SERPL-MCNC: <3 MG/L
EGFRCR SERPLBLD CKD-EPI 2021: >90 ML/MIN/1.73M2
EOSINOPHIL # BLD AUTO: 0 10E3/UL (ref 0–0.7)
EOSINOPHIL NFR BLD AUTO: 0 %
ERYTHROCYTE [DISTWIDTH] IN BLOOD BY AUTOMATED COUNT: 12.1 % (ref 10–15)
HCT VFR BLD AUTO: 37.3 % (ref 35–47)
HGB BLD-MCNC: 13 G/DL (ref 11.7–15.7)
HOLD SPECIMEN: NORMAL
IMM GRANULOCYTES # BLD: 0 10E3/UL
IMM GRANULOCYTES NFR BLD: 1 %
LYMPHOCYTES # BLD AUTO: 0.9 10E3/UL (ref 0.8–5.3)
LYMPHOCYTES NFR BLD AUTO: 13 %
MCH RBC QN AUTO: 32.1 PG (ref 26.5–33)
MCHC RBC AUTO-ENTMCNC: 34.9 G/DL (ref 31.5–36.5)
MCV RBC AUTO: 92 FL (ref 78–100)
MONOCYTES # BLD AUTO: 0.1 10E3/UL (ref 0–1.3)
MONOCYTES NFR BLD AUTO: 2 %
NEUTROPHILS # BLD AUTO: 5.6 10E3/UL (ref 1.6–8.3)
NEUTROPHILS NFR BLD AUTO: 83 %
NRBC # BLD AUTO: 0 10E3/UL
NRBC BLD AUTO-RTO: 0 /100
PLATELET # BLD AUTO: 281 10E3/UL (ref 150–450)
RBC # BLD AUTO: 4.05 10E6/UL (ref 3.8–5.2)
WBC # BLD AUTO: 6.7 10E3/UL (ref 4–11)

## 2024-05-28 PROCEDURE — 82565 ASSAY OF CREATININE: CPT | Performed by: INTERNAL MEDICINE

## 2024-05-28 PROCEDURE — 85025 COMPLETE CBC W/AUTO DIFF WBC: CPT | Performed by: INTERNAL MEDICINE

## 2024-05-28 PROCEDURE — 84450 TRANSFERASE (AST) (SGOT): CPT | Performed by: INTERNAL MEDICINE

## 2024-05-28 PROCEDURE — 84520 ASSAY OF UREA NITROGEN: CPT | Performed by: INTERNAL MEDICINE

## 2024-05-28 PROCEDURE — 86140 C-REACTIVE PROTEIN: CPT | Performed by: INTERNAL MEDICINE

## 2024-05-28 PROCEDURE — 84460 ALANINE AMINO (ALT) (SGPT): CPT | Performed by: INTERNAL MEDICINE

## 2024-05-29 ENCOUNTER — TELEPHONE (OUTPATIENT)
Dept: CARDIOLOGY | Facility: CLINIC | Age: 54
End: 2024-05-29
Payer: COMMERCIAL

## 2024-05-29 NOTE — TELEPHONE ENCOUNTER
Pt had called wanting to let the clinic know that she was recently in the hospital with a brain mass that she had to have surgery on.  Stated that she had a total of 6 MRIs and was wondering if that would effect the loop recorder.     Told her that this was ok and that there was no issue with that.  I did tell her though that in the future we should get a manual transmission prior to her MRI and another after.      Scheduled courtesy remote check for this coming Monday prior to MRI and then Pt will send another transmission once she gets back.     Pt will call the clinic if she has any issues.

## 2024-06-02 ENCOUNTER — HEALTH MAINTENANCE LETTER (OUTPATIENT)
Age: 54
End: 2024-06-02

## 2024-06-03 ENCOUNTER — ANCILLARY PROCEDURE (OUTPATIENT)
Dept: CARDIOLOGY | Facility: CLINIC | Age: 54
End: 2024-06-03
Attending: INTERNAL MEDICINE
Payer: COMMERCIAL

## 2024-06-03 ENCOUNTER — TELEPHONE (OUTPATIENT)
Dept: CARDIOLOGY | Facility: CLINIC | Age: 54
End: 2024-06-03

## 2024-06-03 ENCOUNTER — LAB REQUISITION (OUTPATIENT)
Dept: LAB | Facility: CLINIC | Age: 54
End: 2024-06-03
Payer: COMMERCIAL

## 2024-06-03 ENCOUNTER — TRANSFERRED RECORDS (OUTPATIENT)
Dept: HEALTH INFORMATION MANAGEMENT | Facility: CLINIC | Age: 54
End: 2024-06-03

## 2024-06-03 DIAGNOSIS — G06.0 INTRACRANIAL ABSCESS AND GRANULOMA: ICD-10-CM

## 2024-06-03 DIAGNOSIS — R55 SYNCOPE: ICD-10-CM

## 2024-06-03 DIAGNOSIS — Z95.818 STATUS POST PLACEMENT OF IMPLANTABLE LOOP RECORDER: ICD-10-CM

## 2024-06-03 LAB
ALT SERPL W P-5'-P-CCNC: 25 U/L (ref 0–50)
AST SERPL W P-5'-P-CCNC: 25 U/L (ref 0–45)
BASOPHILS # BLD AUTO: 0.1 10E3/UL (ref 0–0.2)
BASOPHILS NFR BLD AUTO: 1 %
BUN SERPL-MCNC: 8.1 MG/DL (ref 6–20)
CREAT SERPL-MCNC: 0.54 MG/DL (ref 0.51–0.95)
CRP SERPL-MCNC: <3 MG/L
EGFRCR SERPLBLD CKD-EPI 2021: >90 ML/MIN/1.73M2
EOSINOPHIL # BLD AUTO: 0 10E3/UL (ref 0–0.7)
EOSINOPHIL NFR BLD AUTO: 0 %
ERYTHROCYTE [DISTWIDTH] IN BLOOD BY AUTOMATED COUNT: 12.5 % (ref 10–15)
HCT VFR BLD AUTO: 36 % (ref 35–47)
HGB BLD-MCNC: 12.1 G/DL (ref 11.7–15.7)
IMM GRANULOCYTES # BLD: 0 10E3/UL
IMM GRANULOCYTES NFR BLD: 0 %
LYMPHOCYTES # BLD AUTO: 2.5 10E3/UL (ref 0.8–5.3)
LYMPHOCYTES NFR BLD AUTO: 41 %
MCH RBC QN AUTO: 31.8 PG (ref 26.5–33)
MCHC RBC AUTO-ENTMCNC: 33.6 G/DL (ref 31.5–36.5)
MCV RBC AUTO: 95 FL (ref 78–100)
MONOCYTES # BLD AUTO: 0.5 10E3/UL (ref 0–1.3)
MONOCYTES NFR BLD AUTO: 7 %
NEUTROPHILS # BLD AUTO: 3 10E3/UL (ref 1.6–8.3)
NEUTROPHILS NFR BLD AUTO: 51 %
NRBC # BLD AUTO: 0 10E3/UL
NRBC BLD AUTO-RTO: 0 /100
PLATELET # BLD AUTO: 239 10E3/UL (ref 150–450)
RBC # BLD AUTO: 3.81 10E6/UL (ref 3.8–5.2)
WBC # BLD AUTO: 6.1 10E3/UL (ref 4–11)

## 2024-06-03 PROCEDURE — 84450 TRANSFERASE (AST) (SGOT): CPT | Performed by: INTERNAL MEDICINE

## 2024-06-03 PROCEDURE — 86140 C-REACTIVE PROTEIN: CPT | Performed by: INTERNAL MEDICINE

## 2024-06-03 PROCEDURE — 85004 AUTOMATED DIFF WBC COUNT: CPT | Performed by: INTERNAL MEDICINE

## 2024-06-03 PROCEDURE — 84460 ALANINE AMINO (ALT) (SGPT): CPT | Performed by: INTERNAL MEDICINE

## 2024-06-03 PROCEDURE — 82565 ASSAY OF CREATININE: CPT | Performed by: INTERNAL MEDICINE

## 2024-06-03 PROCEDURE — 84520 ASSAY OF UREA NITROGEN: CPT | Performed by: INTERNAL MEDICINE

## 2024-06-03 NOTE — TELEPHONE ENCOUNTER
Pt had MRI today.  states he was instructed to send a transmission after just to be sure device is working.    Transmission received. No events. Battery stable.   Assured pt and  device was functioning normal post MRI  GFrieddoris PFEIFFER

## 2024-06-05 ENCOUNTER — TRANSFERRED RECORDS (OUTPATIENT)
Dept: HEALTH INFORMATION MANAGEMENT | Facility: CLINIC | Age: 54
End: 2024-06-05

## 2024-06-05 LAB
MDC_IDC_EPISODE_DTM: NORMAL
MDC_IDC_EPISODE_DTM: NORMAL
MDC_IDC_EPISODE_ID: NORMAL
MDC_IDC_EPISODE_ID: NORMAL
MDC_IDC_EPISODE_TYPE: NORMAL
MDC_IDC_EPISODE_TYPE: NORMAL
MDC_IDC_MSMT_BATTERY_DTM: NORMAL
MDC_IDC_MSMT_BATTERY_STATUS: NORMAL
MDC_IDC_PG_IMPLANT_DTM: NORMAL
MDC_IDC_PG_MFG: NORMAL
MDC_IDC_PG_MODEL: NORMAL
MDC_IDC_PG_SERIAL: NORMAL
MDC_IDC_PG_TYPE: NORMAL
MDC_IDC_SESS_CLINIC_NAME: NORMAL
MDC_IDC_SESS_DTM: NORMAL
MDC_IDC_SESS_TYPE: NORMAL
MDC_IDC_STAT_AT_BURDEN_PERCENT: 0 %
MDC_IDC_STAT_AT_DTM_START: NORMAL

## 2024-06-06 ENCOUNTER — TRANSFERRED RECORDS (OUTPATIENT)
Dept: HEALTH INFORMATION MANAGEMENT | Facility: CLINIC | Age: 54
End: 2024-06-06
Payer: COMMERCIAL

## 2024-06-10 ENCOUNTER — LAB REQUISITION (OUTPATIENT)
Dept: LAB | Facility: CLINIC | Age: 54
End: 2024-06-10
Payer: COMMERCIAL

## 2024-06-10 ENCOUNTER — MEDICAL CORRESPONDENCE (OUTPATIENT)
Dept: HEALTH INFORMATION MANAGEMENT | Facility: CLINIC | Age: 54
End: 2024-06-10

## 2024-06-10 DIAGNOSIS — G06.0 INTRACRANIAL ABSCESS AND GRANULOMA: ICD-10-CM

## 2024-06-10 LAB
ALT SERPL W P-5'-P-CCNC: 17 U/L (ref 0–50)
AST SERPL W P-5'-P-CCNC: 24 U/L (ref 0–45)
BUN SERPL-MCNC: 7.2 MG/DL (ref 6–20)
CREAT SERPL-MCNC: 0.53 MG/DL (ref 0.51–0.95)
CRP SERPL-MCNC: <3 MG/L
EGFRCR SERPLBLD CKD-EPI 2021: >90 ML/MIN/1.73M2
ERYTHROCYTE [DISTWIDTH] IN BLOOD BY AUTOMATED COUNT: 12.4 % (ref 10–15)
HCT VFR BLD AUTO: 35.9 % (ref 35–47)
HGB BLD-MCNC: 12.1 G/DL (ref 11.7–15.7)
MCH RBC QN AUTO: 31.8 PG (ref 26.5–33)
MCHC RBC AUTO-ENTMCNC: 33.7 G/DL (ref 31.5–36.5)
MCV RBC AUTO: 95 FL (ref 78–100)
PLATELET # BLD AUTO: 251 10E3/UL (ref 150–450)
RBC # BLD AUTO: 3.8 10E6/UL (ref 3.8–5.2)
WBC # BLD AUTO: 4.4 10E3/UL (ref 4–11)

## 2024-06-10 PROCEDURE — 84450 TRANSFERASE (AST) (SGOT): CPT | Performed by: INTERNAL MEDICINE

## 2024-06-10 PROCEDURE — 84520 ASSAY OF UREA NITROGEN: CPT | Performed by: INTERNAL MEDICINE

## 2024-06-10 PROCEDURE — 84460 ALANINE AMINO (ALT) (SGPT): CPT | Performed by: INTERNAL MEDICINE

## 2024-06-10 PROCEDURE — 82565 ASSAY OF CREATININE: CPT | Performed by: INTERNAL MEDICINE

## 2024-06-10 PROCEDURE — 86140 C-REACTIVE PROTEIN: CPT | Performed by: INTERNAL MEDICINE

## 2024-06-10 PROCEDURE — 85027 COMPLETE CBC AUTOMATED: CPT | Performed by: INTERNAL MEDICINE

## 2024-06-11 ENCOUNTER — TRANSCRIBE ORDERS (OUTPATIENT)
Dept: OTHER | Age: 54
End: 2024-06-11

## 2024-06-11 DIAGNOSIS — G06.0 BRAIN ABSCESS: Primary | ICD-10-CM

## 2024-06-12 ENCOUNTER — TELEPHONE (OUTPATIENT)
Dept: NEUROLOGY | Facility: CLINIC | Age: 54
End: 2024-06-12
Payer: COMMERCIAL

## 2024-06-12 NOTE — TELEPHONE ENCOUNTER
[facility]    Phone Message    May a detailed message be left on voicemail: yes     Reason for Call: Appointment Intake    Referring Provider Name: CARLO MICHAEL  Diagnosis and/or Symptoms: Brain abscess      Please call Lauren at [phone] to discuss further.      Action Taken: Message routed to:  Other:  Neurology    Travel Screening: Not Applicable     Date of Service:

## 2024-06-14 ENCOUNTER — MEDICAL CORRESPONDENCE (OUTPATIENT)
Dept: HEALTH INFORMATION MANAGEMENT | Facility: CLINIC | Age: 54
End: 2024-06-14
Payer: COMMERCIAL

## 2024-06-14 ENCOUNTER — TRANSFERRED RECORDS (OUTPATIENT)
Dept: HEALTH INFORMATION MANAGEMENT | Facility: CLINIC | Age: 54
End: 2024-06-14
Payer: COMMERCIAL

## 2024-06-17 ENCOUNTER — LAB REQUISITION (OUTPATIENT)
Dept: LAB | Facility: CLINIC | Age: 54
End: 2024-06-17
Payer: COMMERCIAL

## 2024-06-17 ENCOUNTER — TRANSCRIBE ORDERS (OUTPATIENT)
Dept: OTHER | Age: 54
End: 2024-06-17

## 2024-06-17 DIAGNOSIS — G06.0 INTRACRANIAL ABSCESS AND GRANULOMA: ICD-10-CM

## 2024-06-17 DIAGNOSIS — R48.2 APRAXIA: Primary | ICD-10-CM

## 2024-06-17 LAB
ALT SERPL W P-5'-P-CCNC: 19 U/L (ref 0–50)
AST SERPL W P-5'-P-CCNC: 25 U/L (ref 0–45)
BASOPHILS # BLD AUTO: 0 10E3/UL (ref 0–0.2)
BASOPHILS NFR BLD AUTO: 1 %
BUN SERPL-MCNC: 9.2 MG/DL (ref 6–20)
CREAT SERPL-MCNC: 0.52 MG/DL (ref 0.51–0.95)
CRP SERPL-MCNC: <3 MG/L
EGFRCR SERPLBLD CKD-EPI 2021: >90 ML/MIN/1.73M2
EOSINOPHIL # BLD AUTO: 0 10E3/UL (ref 0–0.7)
EOSINOPHIL NFR BLD AUTO: 0 %
ERYTHROCYTE [DISTWIDTH] IN BLOOD BY AUTOMATED COUNT: 12.3 % (ref 10–15)
HCT VFR BLD AUTO: 36 % (ref 35–47)
HGB BLD-MCNC: 12.5 G/DL (ref 11.7–15.7)
IMM GRANULOCYTES # BLD: 0 10E3/UL
IMM GRANULOCYTES NFR BLD: 0 %
LYMPHOCYTES # BLD AUTO: 1.8 10E3/UL (ref 0.8–5.3)
LYMPHOCYTES NFR BLD AUTO: 33 %
MCH RBC QN AUTO: 32.4 PG (ref 26.5–33)
MCHC RBC AUTO-ENTMCNC: 34.7 G/DL (ref 31.5–36.5)
MCV RBC AUTO: 93 FL (ref 78–100)
MONOCYTES # BLD AUTO: 0.4 10E3/UL (ref 0–1.3)
MONOCYTES NFR BLD AUTO: 8 %
NEUTROPHILS # BLD AUTO: 3.1 10E3/UL (ref 1.6–8.3)
NEUTROPHILS NFR BLD AUTO: 58 %
NRBC # BLD AUTO: 0 10E3/UL
NRBC BLD AUTO-RTO: 0 /100
PLATELET # BLD AUTO: 336 10E3/UL (ref 150–450)
RBC # BLD AUTO: 3.86 10E6/UL (ref 3.8–5.2)
WBC # BLD AUTO: 5.3 10E3/UL (ref 4–11)

## 2024-06-17 PROCEDURE — 85025 COMPLETE CBC W/AUTO DIFF WBC: CPT | Performed by: INTERNAL MEDICINE

## 2024-06-17 PROCEDURE — 84520 ASSAY OF UREA NITROGEN: CPT | Performed by: INTERNAL MEDICINE

## 2024-06-17 PROCEDURE — 84450 TRANSFERASE (AST) (SGOT): CPT | Performed by: INTERNAL MEDICINE

## 2024-06-17 PROCEDURE — 84460 ALANINE AMINO (ALT) (SGPT): CPT | Performed by: INTERNAL MEDICINE

## 2024-06-17 PROCEDURE — 82565 ASSAY OF CREATININE: CPT | Performed by: INTERNAL MEDICINE

## 2024-06-17 PROCEDURE — 86140 C-REACTIVE PROTEIN: CPT | Performed by: INTERNAL MEDICINE

## 2024-06-18 ENCOUNTER — TRANSFERRED RECORDS (OUTPATIENT)
Dept: HEALTH INFORMATION MANAGEMENT | Facility: CLINIC | Age: 54
End: 2024-06-18
Payer: COMMERCIAL

## 2024-06-24 ENCOUNTER — LAB REQUISITION (OUTPATIENT)
Dept: LAB | Facility: CLINIC | Age: 54
End: 2024-06-24
Payer: COMMERCIAL

## 2024-06-24 DIAGNOSIS — G06.0 INTRACRANIAL ABSCESS AND GRANULOMA: ICD-10-CM

## 2024-06-24 LAB
ALT SERPL W P-5'-P-CCNC: 14 U/L (ref 0–50)
AST SERPL W P-5'-P-CCNC: 30 U/L (ref 0–45)
BUN SERPL-MCNC: 6.1 MG/DL (ref 6–20)
CREAT SERPL-MCNC: 0.55 MG/DL (ref 0.51–0.95)
CRP SERPL-MCNC: <3 MG/L
EGFRCR SERPLBLD CKD-EPI 2021: >90 ML/MIN/1.73M2
ERYTHROCYTE [DISTWIDTH] IN BLOOD BY AUTOMATED COUNT: 12.8 % (ref 10–15)
HCT VFR BLD AUTO: 36.8 % (ref 35–47)
HGB BLD-MCNC: 12.3 G/DL (ref 11.7–15.7)
MCH RBC QN AUTO: 31.7 PG (ref 26.5–33)
MCHC RBC AUTO-ENTMCNC: 33.4 G/DL (ref 31.5–36.5)
MCV RBC AUTO: 95 FL (ref 78–100)
PLATELET # BLD AUTO: 333 10E3/UL (ref 150–450)
RBC # BLD AUTO: 3.88 10E6/UL (ref 3.8–5.2)
WBC # BLD AUTO: 4.5 10E3/UL (ref 4–11)

## 2024-06-24 PROCEDURE — 84450 TRANSFERASE (AST) (SGOT): CPT | Performed by: INTERNAL MEDICINE

## 2024-06-24 PROCEDURE — 85027 COMPLETE CBC AUTOMATED: CPT | Performed by: INTERNAL MEDICINE

## 2024-06-24 PROCEDURE — 84460 ALANINE AMINO (ALT) (SGPT): CPT | Performed by: INTERNAL MEDICINE

## 2024-06-24 PROCEDURE — 86140 C-REACTIVE PROTEIN: CPT | Performed by: INTERNAL MEDICINE

## 2024-06-24 PROCEDURE — 82565 ASSAY OF CREATININE: CPT | Performed by: INTERNAL MEDICINE

## 2024-06-24 PROCEDURE — 84520 ASSAY OF UREA NITROGEN: CPT | Performed by: INTERNAL MEDICINE

## 2024-06-27 ENCOUNTER — TELEPHONE (OUTPATIENT)
Dept: OPHTHALMOLOGY | Facility: CLINIC | Age: 54
End: 2024-06-27
Payer: COMMERCIAL

## 2024-06-27 NOTE — TELEPHONE ENCOUNTER
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 Pt is having the referral and records faxed. Please contact pt to discuss options Thank you     Action Taken: Message routed to:  [facility] eye    Travel Screening: Not Applicable     Date of Service:

## 2024-06-28 ENCOUNTER — TRANSCRIBE ORDERS (OUTPATIENT)
Dept: OTHER | Age: 54
End: 2024-06-28

## 2024-06-28 DIAGNOSIS — H53.9 VISUAL DISTURBANCE: Primary | ICD-10-CM

## 2024-06-28 NOTE — TELEPHONE ENCOUNTER
Called and LVM     We sent a [employer] message     Ok to schedule with Dr. Humphrey  in a neuro spot for next available     Neisha Burden Communication Facilitator on 6/28/2024 at 2:43 PM

## 2024-07-01 ENCOUNTER — LAB REQUISITION (OUTPATIENT)
Dept: LAB | Facility: CLINIC | Age: 54
End: 2024-07-01
Payer: COMMERCIAL

## 2024-07-01 DIAGNOSIS — G06.0 INTRACRANIAL ABSCESS AND GRANULOMA: ICD-10-CM

## 2024-07-01 LAB
ALT SERPL W P-5'-P-CCNC: 16 U/L (ref 0–50)
AST SERPL W P-5'-P-CCNC: 24 U/L (ref 0–45)
BASOPHILS # BLD AUTO: 0.1 10E3/UL (ref 0–0.2)
BASOPHILS NFR BLD AUTO: 1 %
BUN SERPL-MCNC: 7.3 MG/DL (ref 6–20)
CREAT SERPL-MCNC: 0.54 MG/DL (ref 0.51–0.95)
CRP SERPL-MCNC: <3 MG/L
EGFRCR SERPLBLD CKD-EPI 2021: >90 ML/MIN/1.73M2
EOSINOPHIL # BLD AUTO: 0.1 10E3/UL (ref 0–0.7)
EOSINOPHIL NFR BLD AUTO: 2 %
ERYTHROCYTE [DISTWIDTH] IN BLOOD BY AUTOMATED COUNT: 12.3 % (ref 10–15)
HCT VFR BLD AUTO: 36.2 % (ref 35–47)
HGB BLD-MCNC: 12.3 G/DL (ref 11.7–15.7)
IMM GRANULOCYTES # BLD: 0 10E3/UL
IMM GRANULOCYTES NFR BLD: 0 %
LYMPHOCYTES # BLD AUTO: 1.4 10E3/UL (ref 0.8–5.3)
LYMPHOCYTES NFR BLD AUTO: 26 %
MCH RBC QN AUTO: 31.9 PG (ref 26.5–33)
MCHC RBC AUTO-ENTMCNC: 34 G/DL (ref 31.5–36.5)
MCV RBC AUTO: 94 FL (ref 78–100)
MONOCYTES # BLD AUTO: 0.3 10E3/UL (ref 0–1.3)
MONOCYTES NFR BLD AUTO: 6 %
NEUTROPHILS # BLD AUTO: 3.4 10E3/UL (ref 1.6–8.3)
NEUTROPHILS NFR BLD AUTO: 65 %
NRBC # BLD AUTO: 0 10E3/UL
NRBC BLD AUTO-RTO: 0 /100
PLATELET # BLD AUTO: 283 10E3/UL (ref 150–450)
RBC # BLD AUTO: 3.85 10E6/UL (ref 3.8–5.2)
WBC # BLD AUTO: 5.2 10E3/UL (ref 4–11)

## 2024-07-01 PROCEDURE — 85025 COMPLETE CBC W/AUTO DIFF WBC: CPT | Performed by: INTERNAL MEDICINE

## 2024-07-01 PROCEDURE — 84450 TRANSFERASE (AST) (SGOT): CPT | Performed by: INTERNAL MEDICINE

## 2024-07-01 PROCEDURE — 84460 ALANINE AMINO (ALT) (SGPT): CPT | Performed by: INTERNAL MEDICINE

## 2024-07-01 PROCEDURE — 82565 ASSAY OF CREATININE: CPT | Performed by: INTERNAL MEDICINE

## 2024-07-01 PROCEDURE — 84520 ASSAY OF UREA NITROGEN: CPT | Performed by: INTERNAL MEDICINE

## 2024-07-01 PROCEDURE — 86140 C-REACTIVE PROTEIN: CPT | Performed by: INTERNAL MEDICINE

## 2024-07-24 NOTE — PROGRESS NOTES
Laurne Calvo is a 53 year old female with the following diagnoses:   1. Right homonymous superior quadrantanopia           HPI:    Lauren was found to have left parietoccipital brain abscess (s/p craniotomy and drainage) after presenting to the ED for headache and gait issues.  She is a patient of Dr. Mae.    She is walking comfortably.  She reports that overall, her vision seems to be improving.  She does notice that she bumps into things or misses things on her right side.  She has been cleared to drive and is doing so confidently.  She has been looking over her shoulder and scanning more with right-sided maneuvers.    She reports that she reads comfortably with +1.25 cheaters.     She has completed OT/ST/PT.    Independent historians:  Patient    Review of outside testing:  MRI Brain WWO 7/3/24:  IMPRESSION:   Further decrease in size of the left parietal lobe pyogenic abscess measuring up to 3.8 cm, previously up to 5.2 cm. Local mass effect and surrounding vasogenic edema has improved. The abscess abuts the posterior horn of the left lateral ventricle with no convincing abnormal ependymal enhancement. No layering complex fluid within the ventricles.     Review of outside clinical notes:  Visit with Dr. Mae 6/5/24:  This 53-year-old woman underwent drainage of a brain abscess last month. She has been on IV antibiotics since that time and the most recent MRI shows decreased size of the complex multiloculated abscess cavity.    Overall she is doing quite well and feels normal. She is back to her baseline doing a fair bit of exercise and very active. Her vision has improved dramatically. Her weakness has resolved and any language issues have dramatically improved as well.    The MRI is certainly better although the abscess cavity is not shrinking quite as fast as I would have preferred. I agree with the decision by infectious disease to continue her on the outpatient IV antibiotics. She is scheduled  "for a follow-up imaging study next month. It was scheduled as a head CT and I do not have any problems with a head CT for following this lesion, although I think she is going to need multiple scans and I would prefer an MRI just to avoid the radiation for her. We will therefore change her to an MRI scan and then continue to follow her closely. I explained to her that if the abscesses were to ever increase in size or she were to develop new symptoms, she might need an additional needle drainage procedure. She understands. She will let us know if she has any changes or worsening in her symptoms.     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 S/p craniotomy on 5/3 with drainage of abscess per Dr. Mae. Cultures positive for strep gordonii  - continue keppra 500 mg BID, and dilantin x 7 days (end 5/10)  - ID was consulted:   - continue IV ceftriaxone 2g BID and metronidazole 500 mg q8 hrs, planning 6 week course (6/13)              - weekly CBC/diff, CRP, Cr/BUN and AST   - follow up in ID clinic with Dr. Toledo (scheduled 6/4)  - had increased cognitive difficulty and increased difficulty with mobility on 5/14, ID/NSGY updated, stat MRI brain ordered which showed \"complex fluid within the operative cavity with peripheral rim enhancement. Vasogenic edema and midline shift stable from previous\" NSGY restarted decadron 3 mg TID. EEG obtained, inconclusive, continuous EEG obtained without epileptiform activity. Had Significant clinical improvement with the decadron. MRI brain was repeated on 5/21 and showed improvement.   - Slow decadron taper started per NSGY, 3 mg BID x 4 days, then 3 mg daily x 4 days, then 1 mg daily x 4 " days. Closely monitor for any neuro changes during taper.   - follow up with neurosurgery in 2 weeks with brain MRI    Past medical history:    Patient Active Problem List   Diagnosis    CARDIOVASCULAR SCREENING; LDL GOAL LESS THAN 160    Abdominal pain, right upper quadrant    Vasovagal syncope    Chronic diarrhea    Mild concentric left ventricular hypertrophy (LVH)    Mild mitral regurgitation    Mild aortic regurgitation         Medications:   B-12 PO  LIPOGEN OR  MAGNESIUM CITRATE PO  unable to find  vitamin C  zinc gluconate  ZINC PO      Family history / social history:  Patient's family history includes Hypertension in her mother; Other Cancer in her father.     Patient  reports that she has never smoked. She has never used smokeless tobacco. She reports current alcohol use. She reports that she does not use drugs.       Exam:  Uncorrected distance visual acuity was 20/25 +2 in the right eye and 20/20 in the left eye. Intraocular pressure was 18 in the right eye and 20 in the left eye using ICare.  Color vision 11/11 right eye and 11/11 left eye.  Pupils isocoric with no APD.    See complete chart note for anterior segment, fundus, and strabismus exam.    Tests ordered and interpreted today:  OCT RNFL:  Right eye: Average RNFL 92 microns.  Normal compared to age-matched controls.  Left eye: Average RNFL 89 microns.  Normal compared to age-matched controls.    GTop: Right superior quadrantanopia  Right eye: Reliable.  Mean deviation -3.8 dB.    Left eye: Reliable.  Mean deviation -2.0 dB.    Assessment/Plan:   Today, Lauren has a mild right superior quadrantanopia in the setting of left parietoccipital brain abscess s/p craniotomy and drainage.  Her visual field loss has been minimally impactful on her ADLs, and I agree that she is safe to drive.  I counseled on low vision rehabilitation OT for addition peripheral field awareness; at this time, she defers referral.  She has previously been labeled a glaucoma  suspect, likely due to asymmetric C/D left>right.  She may continue with routine eye care.  I am happy to see her in the future with any concerns/changes, but I did not schedule a follow-up today.    Complete documentation of historical and exam elements from today's encounter can be found in the full encounter summary report (not reduplicated in this progress note). I personally obtained the chief complaint(s) and history of present illness. I confirmed and edited as necessary the review of systems, past medical/surgical history, family history, social history, and examination findings as document by others; and I examined the patient myself. I personally reviewed the relevant tests, images, and reports as documented above. I formulated and edited as necessary the assessment and plan and discussed the findings and management plan with the patient and family.    Leila Humphrey, OD

## 2024-08-02 ENCOUNTER — OFFICE VISIT (OUTPATIENT)
Dept: OPHTHALMOLOGY | Facility: CLINIC | Age: 54
End: 2024-08-02
Payer: COMMERCIAL

## 2024-08-02 DIAGNOSIS — H53.461 RIGHT HOMONYMOUS SUPERIOR QUADRANTANOPIA: Primary | ICD-10-CM

## 2024-08-02 PROCEDURE — 99203 OFFICE O/P NEW LOW 30 MIN: CPT

## 2024-08-02 PROCEDURE — 92133 CPTRZD OPH DX IMG PST SGM ON: CPT

## 2024-08-02 PROCEDURE — 99214 OFFICE O/P EST MOD 30 MIN: CPT

## 2024-08-02 PROCEDURE — 92083 EXTENDED VISUAL FIELD XM: CPT

## 2024-08-02 RX ORDER — LEVETIRACETAM 250 MG/1
TABLET, FILM COATED ORAL
COMMUNITY
Start: 2024-05-01

## 2024-08-02 RX ORDER — LEVETIRACETAM 500 MG/1
500 TABLET ORAL
COMMUNITY
End: 2024-08-22

## 2024-08-02 ASSESSMENT — VISUAL ACUITY
OD_SC: 20/25
METHOD: SNELLEN - LINEAR
OS_SC: 20/20
OD_SC+: +2

## 2024-08-02 ASSESSMENT — SLIT LAMP EXAM - LIDS
COMMENTS: NORMAL
COMMENTS: NORMAL

## 2024-08-02 ASSESSMENT — CONF VISUAL FIELD
OD_INFERIOR_NASAL_RESTRICTION: 0
OS_SUPERIOR_TEMPORAL_RESTRICTION: 0
OS_INFERIOR_TEMPORAL_RESTRICTION: 0
OD_SUPERIOR_TEMPORAL_RESTRICTION: 0
METHOD: COUNTING FINGERS
OD_INFERIOR_TEMPORAL_RESTRICTION: 0
OD_SUPERIOR_NASAL_RESTRICTION: 0
OS_INFERIOR_NASAL_RESTRICTION: 0
OS_SUPERIOR_NASAL_RESTRICTION: 0
OD_NORMAL: 1
OS_NORMAL: 1

## 2024-08-02 ASSESSMENT — TONOMETRY
OD_IOP_MMHG: 18
IOP_METHOD: ICARE
OS_IOP_MMHG: 20

## 2024-08-02 ASSESSMENT — EXTERNAL EXAM - LEFT EYE: OS_EXAM: NORMAL

## 2024-08-02 ASSESSMENT — CUP TO DISC RATIO
OD_RATIO: 0.35
OS_RATIO: 0.6

## 2024-08-02 ASSESSMENT — EXTERNAL EXAM - RIGHT EYE: OD_EXAM: NORMAL

## 2024-08-02 NOTE — NURSING NOTE
Chief Complaint(s) and History of Present Illness(es)       Vision Changes Ou    In right eye.  Onset was sudden.  Presenting in peripheral vision.  Severity is moderate.  Occurring constantly.  Since onset it is stable.  Associated symptoms include Negative for eye pain and headache.  Pain was noted as 0/10. Additional comments: Lauren Calvo is a 53 year old female sent for consultation by  Friday for vision changes following craniotomy.  Pt with hx of brain mass, s/p left parieto-occipital craniotomy 05/03/24.  Patient reports she has a right peripheral visual field cut from the surgery.  Says vision is good but has been running into doors and bumping into things.  She reports vision is more blurry during the evening with fatigue.  No eye pain or headaches.    MIGUE Long 8/2/2024 7:32 AM

## 2024-08-15 ENCOUNTER — ANCILLARY PROCEDURE (OUTPATIENT)
Dept: CARDIOLOGY | Facility: CLINIC | Age: 54
End: 2024-08-15
Attending: INTERNAL MEDICINE
Payer: COMMERCIAL

## 2024-08-15 DIAGNOSIS — Z95.818 STATUS POST PLACEMENT OF IMPLANTABLE LOOP RECORDER: ICD-10-CM

## 2024-08-15 DIAGNOSIS — R55 SYNCOPE: ICD-10-CM

## 2024-08-15 PROCEDURE — 93298 REM INTERROG DEV EVAL SCRMS: CPT | Performed by: INTERNAL MEDICINE

## 2024-08-22 ENCOUNTER — OFFICE VISIT (OUTPATIENT)
Dept: CARDIOLOGY | Facility: CLINIC | Age: 54
End: 2024-08-22
Attending: INTERNAL MEDICINE
Payer: COMMERCIAL

## 2024-08-22 VITALS
OXYGEN SATURATION: 100 % | HEIGHT: 62 IN | HEART RATE: 64 BPM | BODY MASS INDEX: 26.76 KG/M2 | SYSTOLIC BLOOD PRESSURE: 139 MMHG | DIASTOLIC BLOOD PRESSURE: 87 MMHG | WEIGHT: 145.4 LBS

## 2024-08-22 DIAGNOSIS — R55 VASOVAGAL SYNCOPE: Primary | ICD-10-CM

## 2024-08-22 DIAGNOSIS — I35.1 MILD AORTIC REGURGITATION: ICD-10-CM

## 2024-08-22 PROCEDURE — 99215 OFFICE O/P EST HI 40 MIN: CPT | Performed by: INTERNAL MEDICINE

## 2024-08-22 NOTE — LETTER
8/22/2024    Rip Hays MD  [address] [place]  [place] [address]    RE: Lauren Calvo       Dear Colleague,     I had the pleasure of seeing Lauren Calvo in the [facility].    General Cardiology Clinic Progress Note  Lauren Calvo MRN# [id number]   YOB: 1970 Age: 53 year old       Reason for visit: Vasovagal syncope    History of presenting illness:    I had the opportunity to see Lauren Calvo at [facility] today for reevaluation of vasovagal syncope.  She has a loop recorder in place which was implanted last fall after she got up in the middle of the night and had a syncopal event in her bathroom with minor head injury.  Prior to that she had a history of syncopal events occurring 2-3 times a year since she was a teenager.  These episodes have often occurred at night, preceded by symptoms of nausea, and resulting in her finding herself on the floor after a syncopal episode.    A Zio patch monitor was done for 12 days but was unrevealing for a cause for her syncopal events.  At that point, we decided to implant a loop recorder.  Shortly after implantation of the loop recorder last fall, she had pauses of 3.3 to 3.7 seconds on 10/20/2023.  She did not have syncope during those episodes, suggesting that they were probably not as long as her more symptomatic episodes, but suggesting a significant vasovagal or conduction system disease events.  I suggested that she meet with Dr. Aragon to consider pacemaker implantation.  She met with him on 11/14/2023.  She was reluctant to undergo pacemaker implantation, and they decided together to continue observation and consider pacemaker implantation if she has more serious events of recurrent syncope.    Since then, she has had a few more episodes, usually associated with food getting stuck in her esophagus, but not associated with any recurrent syncopal events.  Her longest pause was 4 seconds.   She has learned to modify her diet and eating practices to avoid these episodes.  She has not had any further syncopal episodes at night.    In the meantime, she returned from a trip to [place] at the beginning of May 2024 with headaches and some neurologic symptoms including right sided weakness, some speech difficulties, and mild confusion.  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  She is now off antibiotics and her most recent brain MRI shows decreasing size of the abscess pocket.    Since her hospitalization in May, she has not had any subsequent pauses or syncopal events.    She had an echocardiogram done at the time of her hospitalization showing normal left ventricular size and function with mild aortic valve regurgitation and mild mitral regurgitation, unchanged from her previous echo from 2020.  There was no evidence for endocarditis.    On examination here today her blood pressure was 139/87, heart rate 64, and weight 145 pounds.  Her lungs are clear.  Heart rhythm is regular.  She has no cardiac murmurs.              Assessment and Plan:     ASSESSMENT:    Ms. Lauren Calvo is a 53-year-old woman with recurrent but rare syncopal events for many years, typically occurring at night, associated with nausea, and rarely resulting in any injury.  Since implantation of a loop recorder last fall, we have recorded multiple episodes of pauses between 3 and 4 seconds, typically associated with food getting stuck in her esophagus.  The electrograms recorded from her device show findings typical of neurally-mediated bradycardia and pauses, confirming suspicions that this is most likely vasovagal in origin.  She has elected not to proceed with permanent pacemaker implantation at this time and we are continuing to monitor her  rhythms.    In the meantime, she developed a large left-sided brain abscess requiring neurosurgery and prolonged antibiotic treatment and rehab.  She is now recovering from that well.    There is no evidence of endocarditis associated with her brain infection, although the bacteria was strep viridans species.  She was treated with at least 8 weeks of IV antibiotics which would have treated endocarditis in that situation.  She is not having any symptoms of recurrent infection now after being off antibiotics for a number of weeks.    We will continue to monitor her rhythms through her loop recorder and stay open to the possibility that she might eventually need a permanent pacemaker if this situation continues to progress and causes recurrent syncope, especially with injury.  At this time I do not see any reason to be more aggressive with pacemaker implantation yet.    Stevan Pierson MD           Orders this Visit:  Orders Placed This Encounter   Procedures     Follow-Up with Cardiology     No orders of the defined types were placed in this encounter.    Medications Discontinued During This Encounter   Medication Reason     Multiple Vitamins-Minerals (ZINC PO)      UNABLE TO FIND      UNABLE TO FIND      UNABLE TO FIND      UNABLE TO FIND      UNABLE TO FIND      levETIRAcetam (KEPPRA) 500 MG tablet        Today's clinic visit entailed:    50 minutes spent by me on the date of the encounter doing chart review, history and exam, documentation and further activities per the note  Provider  Link to [facility] Help Grid     The level of medical decision making during this visit was of high complexity.           Review of Systems:     Review of Systems:  Skin:  Negative rash;bruising;lumps or bumps   Eyes:  Negative visual blurring;double vision  ENT:  Negative tinnitus;vertigo  Respiratory:  Negative shortness of breath  Cardiovascular:  Negative;palpitations;chest pain;edema;fatigue;lightheadedness;dizziness    Gastroenterology:  "Negative poor appetite;nausea;vomiting  Genitourinary:  Negative urinary frequency;urgency;hesitancy  Musculoskeletal:  Negative gout;fibromyalgia  Neurologic:  Negative migraine headaches;headaches;numbness or tingling of hands;numbness or tingling of feet  Psychiatric:  Negative excessive stress;sleep disturbances  Heme/Lymph/Imm:  Negative anemia;bleeding disorder  Endocrine:  Negative thyroid disorder;diabetes            Physical Exam:     Vitals: /87   Pulse 64   Ht 1.575 m (5' 2\")   Wt 66 kg (145 lb 6.4 oz)   SpO2 100%   BMI 26.59 kg/m    Constitutional: Well nourished and in no apparent distress.  Eyes: Pupils equal, round. Sclerae anicteric.   HEENT: Normocephalic, atraumatic.   Neck: Supple. JVD   Respiratory: Breathing non-labored. Lungs clear to auscultation bilaterally. No crackles, wheezes, rhonchi, or rales.  Cardiovascular:  Regular rate and rhythm, normal S1 and S2. No murmur, rub, or gallop.  Skin: Warm, dry. No rashes, cyanosis, or xanthelasma.  Extremities: No edema.  Neurologic: No gross motor deficits. Alert, awake, and oriented to person, place and time.  Psychiatric: Affect appropriate.             Medications:     Current Outpatient Medications   Medication Sig Dispense Refill     Cyanocobalamin (B-12 PO) Take by mouth daily       KEPPRA 250 MG tablet 1 tablet Orally every 12 hrs for 60 days       MAGNESIUM CITRATE PO Take by mouth daily       vitamin C (ASCORBIC ACID) 100 MG tablet Take 1,000 mg by mouth daily       Vitamins-Lipotropics (LIPOGEN OR) Take by mouth daily       zinc gluconate 50 MG tablet Take 50 mg by mouth daily         Family History   Problem Relation Age of Onset     Hypertension Mother      Other Cancer Father         bladder        Social History     Socioeconomic History     Marital status:      Spouse name: Not on file     Number of children: Not on file     Years of education: Not on file     Highest education level: Not on file   Occupational " History     Not on file   Tobacco Use     Smoking status: Never     Smokeless tobacco: Never   Vaping Use     Vaping status: Never Used   Substance and Sexual Activity     Alcohol use: Yes     Comment: socially     Drug use: No     Sexual activity: Yes     Partners: Male   Other Topics Concern     Parent/sibling w/ CABG, MI or angioplasty before 65F 55M? Not Asked   Social History Narrative     Not on file     Social Determinants of Health     Financial Resource Strain: Not on file   Food Insecurity: No Food Insecurity (5/7/2024)    Received from [facility]     Hunger Vital Sign      Worried About Running Out of Food in the Last Year: Never true      Ran Out of Food in the Last Year: Never true   Transportation Needs: No Transportation Needs (5/22/2024)    Received from [facility]     PRAPARE - Transportation      Lack of Transportation (Medical): No      Lack of Transportation (Non-Medical): No   Physical Activity: Not on file   Stress: Not on file   Social Connections: Not on file   Interpersonal Safety: Not At Risk (5/7/2024)    Received from [facility]     Humiliation, Afraid, Rape, and Kick questionnaire      Fear of Current or Ex-Partner: No      Emotionally Abused: No      Physically Abused: No      Sexually Abused: No   Housing Stability: Low Risk  (5/7/2024)    Received from [facility]     Housing Stability Vital Sign      Unable to Pay for Housing in the Last Year: No      Number of Places Lived in the Last Year: 1      Unstable Housing in the Last Year: No            Past Medical History:   No past medical history on file.           Past Surgical History:     Past Surgical History:   Procedure Laterality Date     EP LOOP RECORDER IMPLANT N/A 10/16/2023    Procedure: Loop Recorder Implant;  Surgeon: Fátima Aragon MD;  Location:  HEART CARDIAC CATH LAB     GYN SURGERY      3 C-sections              Allergies:   Compazine [prochlorperazine], Egg  "white [chicken-derived products (egg)], and Midrin [isometheptene-apap-dichloral]       Data:   All laboratory data reviewed:    No lab results found.    Invalid input(s): \"CMP\", \"CBC\"    Lab Results   Component Value Date    WBC 5.2 07/01/2024    WBC 8.4 12/15/2015    RBC 3.85 07/01/2024    RBC 3.92 12/15/2015    HGB 12.3 07/01/2024    HGB 12.3 12/15/2015    HCT 36.2 07/01/2024    HCT 37.0 12/15/2015    MCV 94 07/01/2024    MCV 94 12/15/2015    MCH 31.9 07/01/2024    MCH 31.4 12/15/2015    MCHC 34.0 07/01/2024    MCHC 33.2 12/15/2015    RDW 12.3 07/01/2024    RDW 11.2 12/15/2015     07/01/2024     12/15/2015       Lab Results   Component Value Date     10/16/2023     12/15/2015    POTASSIUM 3.5 10/16/2023    POTASSIUM 3.8 12/15/2015    CHLORIDE 104 10/16/2023    CHLORIDE 106 12/15/2015    CO2 23 10/16/2023    CO2 27 12/15/2015    ANIONGAP 15 10/16/2023    ANIONGAP 8 12/15/2015    GLC 89 10/16/2023    GLC 97 12/15/2015    BUN 7.3 07/01/2024    BUN 9 12/15/2015    CR 0.54 07/01/2024    CR 0.84 12/15/2015    GFRESTIMATED >90 07/01/2024    GFRESTIMATED 72 12/15/2015    GFRESTBLACK 88 12/15/2015    CAROL 9.8 10/16/2023    CAROL 9.1 12/15/2015      Lab Results   Component Value Date    AST 24 07/01/2024    AST 14 12/15/2015    ALT 16 07/01/2024    ALT 21 12/15/2015       No results found for: \"A1C\"    No results found for: \"INR\"      VALERIE PIERSON MD  [facility]    Thank you for allowing me to participate in the care of your patient.      Sincerely,     VALERIE PIERSON MD     [facility]  cc:   Valerie Pierson MD  [address]  [place] [id number]      "

## 2024-08-22 NOTE — PROGRESS NOTES
General Cardiology Clinic Progress Note  Lauren Calvo MRN# [id number]   YOB: 1970 Age: 53 year old       Reason for visit: Vasovagal syncope    History of presenting illness:    I had the opportunity to see Lauren Calvo at [facility] today for reevaluation of vasovagal syncope.  She has a loop recorder in place which was implanted last fall after she got up in the middle of the night and had a syncopal event in her bathroom with minor head injury.  Prior to that she had a history of syncopal events occurring 2-3 times a year since she was a teenager.  These episodes have often occurred at night, preceded by symptoms of nausea, and resulting in her finding herself on the floor after a syncopal episode.    A Zio patch monitor was done for 12 days but was unrevealing for a cause for her syncopal events.  At that point, we decided to implant a loop recorder.  Shortly after implantation of the loop recorder last fall, she had pauses of 3.3 to 3.7 seconds on 10/20/2023.  She did not have syncope during those episodes, suggesting that they were probably not as long as her more symptomatic episodes, but suggesting a significant vasovagal or conduction system disease events.  I suggested that she meet with Dr. Aragon to consider pacemaker implantation.  She met with him on 11/14/2023.  She was reluctant to undergo pacemaker implantation, and they decided together to continue observation and consider pacemaker implantation if she has more serious events of recurrent syncope.    Since then, she has had a few more episodes, usually associated with food getting stuck in her esophagus, but not associated with any recurrent syncopal events.  Her longest pause was 4 seconds.  She has learned to modify her diet and eating practices to avoid these episodes.  She has not had any further syncopal episodes at night.    In the meantime, she returned from a trip to [place] at the beginning of May 2024 with  headaches and some neurologic symptoms including right sided weakness, some speech difficulties, and mild confusion.  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  She is now off antibiotics and her most recent brain MRI shows decreasing size of the abscess pocket.    Since her hospitalization in May, she has not had any subsequent pauses or syncopal events.    She had an echocardiogram done at the time of her hospitalization showing normal left ventricular size and function with mild aortic valve regurgitation and mild mitral regurgitation, unchanged from her previous echo from 2020.  There was no evidence for endocarditis.    On examination here today her blood pressure was 139/87, heart rate 64, and weight 145 pounds.  Her lungs are clear.  Heart rhythm is regular.  She has no cardiac murmurs.              Assessment and Plan:     ASSESSMENT:    Ms. Lauren Calvo is a 53-year-old woman with recurrent but rare syncopal events for many years, typically occurring at night, associated with nausea, and rarely resulting in any injury.  Since implantation of a loop recorder last fall, we have recorded multiple episodes of pauses between 3 and 4 seconds, typically associated with food getting stuck in her esophagus.  The electrograms recorded from her device show findings typical of neurally-mediated bradycardia and pauses, confirming suspicions that this is most likely vasovagal in origin.  She has elected not to proceed with permanent pacemaker implantation at this time and we are continuing to monitor her rhythms.    In the meantime, she developed a large left-sided brain abscess requiring neurosurgery and prolonged antibiotic treatment and rehab.  She is now recovering from that well.    There is no evidence of endocarditis associated  with her brain infection, although the bacteria was strep viridans species.  She was treated with at least 8 weeks of IV antibiotics which would have treated endocarditis in that situation.  She is not having any symptoms of recurrent infection now after being off antibiotics for a number of weeks.    We will continue to monitor her rhythms through her loop recorder and stay open to the possibility that she might eventually need a permanent pacemaker if this situation continues to progress and causes recurrent syncope, especially with injury.  At this time I do not see any reason to be more aggressive with pacemaker implantation yet.    Stevan Pierson MD           Orders this Visit:  Orders Placed This Encounter   Procedures    Follow-Up with Cardiology     No orders of the defined types were placed in this encounter.    Medications Discontinued During This Encounter   Medication Reason    Multiple Vitamins-Minerals (ZINC PO)     UNABLE TO FIND     UNABLE TO FIND     UNABLE TO FIND     UNABLE TO FIND     UNABLE TO FIND     levETIRAcetam (KEPPRA) 500 MG tablet        Today's clinic visit entailed:    50 minutes spent by me on the date of the encounter doing chart review, history and exam, documentation and further activities per the note  Provider  Link to [facility] Help Grid     The level of medical decision making during this visit was of high complexity.           Review of Systems:     Review of Systems:  Skin:  Negative rash;bruising;lumps or bumps   Eyes:  Negative visual blurring;double vision  ENT:  Negative tinnitus;vertigo  Respiratory:  Negative shortness of breath  Cardiovascular:  Negative;palpitations;chest pain;edema;fatigue;lightheadedness;dizziness    Gastroenterology: Negative poor appetite;nausea;vomiting  Genitourinary:  Negative urinary frequency;urgency;hesitancy  Musculoskeletal:  Negative gout;fibromyalgia  Neurologic:  Negative migraine headaches;headaches;numbness or tingling of hands;numbness or  "tingling of feet  Psychiatric:  Negative excessive stress;sleep disturbances  Heme/Lymph/Imm:  Negative anemia;bleeding disorder  Endocrine:  Negative thyroid disorder;diabetes            Physical Exam:     Vitals: /87   Pulse 64   Ht 1.575 m (5' 2\")   Wt 66 kg (145 lb 6.4 oz)   SpO2 100%   BMI 26.59 kg/m    Constitutional: Well nourished and in no apparent distress.  Eyes: Pupils equal, round. Sclerae anicteric.   HEENT: Normocephalic, atraumatic.   Neck: Supple. JVD   Respiratory: Breathing non-labored. Lungs clear to auscultation bilaterally. No crackles, wheezes, rhonchi, or rales.  Cardiovascular:  Regular rate and rhythm, normal S1 and S2. No murmur, rub, or gallop.  Skin: Warm, dry. No rashes, cyanosis, or xanthelasma.  Extremities: No edema.  Neurologic: No gross motor deficits. Alert, awake, and oriented to person, place and time.  Psychiatric: Affect appropriate.             Medications:     Current Outpatient Medications   Medication Sig Dispense Refill    Cyanocobalamin (B-12 PO) Take by mouth daily      KEPPRA 250 MG tablet 1 tablet Orally every 12 hrs for 60 days      MAGNESIUM CITRATE PO Take by mouth daily      vitamin C (ASCORBIC ACID) 100 MG tablet Take 1,000 mg by mouth daily      Vitamins-Lipotropics (LIPOGEN OR) Take by mouth daily      zinc gluconate 50 MG tablet Take 50 mg by mouth daily         Family History   Problem Relation Age of Onset    Hypertension Mother     Other Cancer Father         bladder        Social History     Socioeconomic History    Marital status:      Spouse name: Not on file    Number of children: Not on file    Years of education: Not on file    Highest education level: Not on file   Occupational History    Not on file   Tobacco Use    Smoking status: Never    Smokeless tobacco: Never   Vaping Use    Vaping status: Never Used   Substance and Sexual Activity    Alcohol use: Yes     Comment: socially    Drug use: No    Sexual activity: Yes     Partners: " "Male   Other Topics Concern    Parent/sibling w/ CABG, MI or angioplasty before 65F 55M? Not Asked   Social History Narrative    Not on file     Social Determinants of Health     Financial Resource Strain: Not on file   Food Insecurity: No Food Insecurity (5/7/2024)    Received from [facility]     Hunger Vital Sign     Worried About Running Out of Food in the Last Year: Never true     Ran Out of Food in the Last Year: Never true   Transportation Needs: No Transportation Needs (5/22/2024)    Received from [facility]     PRAPARE - Transportation     Lack of Transportation (Medical): No     Lack of Transportation (Non-Medical): No   Physical Activity: Not on file   Stress: Not on file   Social Connections: Not on file   Interpersonal Safety: Not At Risk (5/7/2024)    Received from [facility]     Humiliation, Afraid, Rape, and Kick questionnaire     Fear of Current or Ex-Partner: No     Emotionally Abused: No     Physically Abused: No     Sexually Abused: No   Housing Stability: Low Risk  (5/7/2024)    Received from [facility]     Housing Stability Vital Sign     Unable to Pay for Housing in the Last Year: No     Number of Places Lived in the Last Year: 1     Unstable Housing in the Last Year: No            Past Medical History:   No past medical history on file.           Past Surgical History:     Past Surgical History:   Procedure Laterality Date    EP LOOP RECORDER IMPLANT N/A 10/16/2023    Procedure: Loop Recorder Implant;  Surgeon: Fátima Aragon MD;  Location:  HEART CARDIAC CATH LAB    GYN SURGERY      3 C-sections              Allergies:   Compazine [prochlorperazine], Egg white [chicken-derived products (egg)], and Midrin [isometheptene-apap-dichloral]       Data:   All laboratory data reviewed:    No lab results found.    Invalid input(s): \"CMP\", \"CBC\"    Lab Results   Component Value Date    WBC 5.2 07/01/2024    WBC 8.4 12/15/2015    RBC 3.85 " "07/01/2024    RBC 3.92 12/15/2015    HGB 12.3 07/01/2024    HGB 12.3 12/15/2015    HCT 36.2 07/01/2024    HCT 37.0 12/15/2015    MCV 94 07/01/2024    MCV 94 12/15/2015    MCH 31.9 07/01/2024    MCH 31.4 12/15/2015    MCHC 34.0 07/01/2024    MCHC 33.2 12/15/2015    RDW 12.3 07/01/2024    RDW 11.2 12/15/2015     07/01/2024     12/15/2015       Lab Results   Component Value Date     10/16/2023     12/15/2015    POTASSIUM 3.5 10/16/2023    POTASSIUM 3.8 12/15/2015    CHLORIDE 104 10/16/2023    CHLORIDE 106 12/15/2015    CO2 23 10/16/2023    CO2 27 12/15/2015    ANIONGAP 15 10/16/2023    ANIONGAP 8 12/15/2015    GLC 89 10/16/2023    GLC 97 12/15/2015    BUN 7.3 07/01/2024    BUN 9 12/15/2015    CR 0.54 07/01/2024    CR 0.84 12/15/2015    GFRESTIMATED >90 07/01/2024    GFRESTIMATED 72 12/15/2015    GFRESTBLACK 88 12/15/2015    CAROL 9.8 10/16/2023    CAROL 9.1 12/15/2015      Lab Results   Component Value Date    AST 24 07/01/2024    AST 14 12/15/2015    ALT 16 07/01/2024    ALT 21 12/15/2015       No results found for: \"A1C\"    No results found for: \"INR\"      VALERIE MESSINA MD  [facility]  "

## 2024-08-22 NOTE — PATIENT INSTRUCTIONS
It was a pleasure seeing you today and thank you for allowing me to be a part of your health care team.  Should you have any questions regarding your visit or future needs please feel free to reach out to my care team for assistance.      Thank you, Dr. Stevan Pierson        **Nursing: [phone]       **Scheduling: [phone]

## 2024-09-02 ENCOUNTER — TELEPHONE (OUTPATIENT)
Dept: CARDIOLOGY | Facility: CLINIC | Age: 54
End: 2024-09-02
Payer: COMMERCIAL

## 2024-09-02 NOTE — TELEPHONE ENCOUNTER
Alert for 3.4s pause during waking hours that occurred 9/1/24 at 1626 and 9/2/24 at 1711.     Called Pt and left a VM for her to call back to talk about episodes below.       No need to route, unless pt wants to move forward with PPM (see esteban and Dr. Aragon' last note).

## 2024-09-24 ENCOUNTER — TELEPHONE (OUTPATIENT)
Dept: NEUROLOGY | Facility: CLINIC | Age: 54
End: 2024-09-24
Payer: COMMERCIAL

## 2024-09-24 NOTE — TELEPHONE ENCOUNTER
[facility] Call Center     Phone Message     May a detailed message be left on voicemail: yes      Reason for Call: Appointment Intake    Referring Provider Name: FERNANDA CARLO  Diagnosis and/or Symptoms: Brain abscess    Pt states she has already seen a neurosurgeon and infectious disease specialist.         Please call Lauren at [phone] to discuss further.        Action Taken: Message routed to:  Other: [facility] neurology     Travel Screening: Not Applicable          Date of Service:

## 2024-09-27 NOTE — TELEPHONE ENCOUNTER
Called and spoke with patient regarding neurology referral. Writer had attempted to contact patient in June, but was never able to connect. Patient stated she hasn't heard anything or gotten any information about scheduling. Writer explained that in June, Dr. Kelley had stated there wasn't a need for general neuro unless there is a concern for seizures. Patient stated she was put on keppra as a precaution when she had the abscess, but now her PCP is not comfortable taking her off of it until she sees neurology. She also stated she had a referral to [facility] that stated she may have an old stroke according to the images they received. Writer offered next available appointment in February, but patient didn't want to wait that long to get off the keppra. Recommended patient see [facility] or [facility] as they often have sooner availability. Patient stated understanding. Let her know to reach out if there any further concerns.

## 2024-11-07 ENCOUNTER — TELEPHONE (OUTPATIENT)
Dept: CARDIOLOGY | Facility: CLINIC | Age: 54
End: 2024-11-07
Payer: COMMERCIAL

## 2024-11-07 NOTE — TELEPHONE ENCOUNTER
Alert for 4.1s pause during waking hours on [employer] M301 LUX-Dx ICM 11/6/24 at 1510      Called Lauren and discussed episode.  Denies symptoms.  Stated that since her brain surgery she has not been able to detect when these episodes happen.  Will continue to monitor.  HAZEL Power RN

## 2024-11-08 ENCOUNTER — TELEPHONE (OUTPATIENT)
Dept: CARDIOLOGY | Facility: CLINIC | Age: 54
End: 2024-11-08
Payer: COMMERCIAL

## 2024-11-08 DIAGNOSIS — Z13.6 CARDIOVASCULAR SCREENING; LDL GOAL LESS THAN 160: Primary | ICD-10-CM

## 2024-11-08 NOTE — TELEPHONE ENCOUNTER
Pt called back to discuss her lipid results. Pt was told there was evidence of past stroke on her MRI head from 9/2024. Pt's neurologist recommends she start a statin. Pt was aware she has had a past stroke when she had her work up at [place]. She saw her GP recently to discuss keppra dosing and was referred to a local neurologist. GP told her that her lipid labs looked good, but then pt got a call from her neurologist today telling her she should start a statin. Pt was surprised to hear this, and did not find out which statin as she told her neurologist she doesn't want to start one. Pt is scheduled for a head and neck CTA on 11/13. She does have an implantable loop recorder and has never had any report of afib. Pt would like 's thoughts on whether she should start a statin. I will send  an update, and call pt back with his thoughts. Pt aware that  is out of the clinic for the next week. Allie PFEIFFER November 8, 2024, 3:38 PM

## 2024-11-08 NOTE — TELEPHONE ENCOUNTER
I left message for pt to call back to discuss her questions. Neurology records available in [facility]. Allie PFEIFFER November 8, 2024, 12:32 PM

## 2024-11-08 NOTE — TELEPHONE ENCOUNTER
[facility] Call Center    Phone Message    May a detailed message be left on voicemail: yes     Reason for Call: Medication Question or concern regarding medication   Prescription Clarification  Name of Medication: no name given   Prescribing Provider: Dangelo       What on the order needs clarification? Neurology wants pt to start on a Statin but did not give a medication name(s). Pt recently had labs with [facility] and believes her values are within acceptable ranges. Please call pt back to discuss if starting a statin is appropriate from cardiology's side.       Action Taken: Other: cardiology     Travel Screening: Not Applicable      Thank you!  [facility]

## 2024-11-13 ENCOUNTER — ANCILLARY PROCEDURE (OUTPATIENT)
Dept: CT IMAGING | Facility: CLINIC | Age: 54
End: 2024-11-13
Attending: STUDENT IN AN ORGANIZED HEALTH CARE EDUCATION/TRAINING PROGRAM
Payer: COMMERCIAL

## 2024-11-13 DIAGNOSIS — I63.9 CEREBROVASCULAR ACCIDENT (CVA), UNSPECIFIED MECHANISM (H): ICD-10-CM

## 2024-11-13 PROCEDURE — 70498 CT ANGIOGRAPHY NECK: CPT | Mod: GC | Performed by: RADIOLOGY

## 2024-11-13 PROCEDURE — 70496 CT ANGIOGRAPHY HEAD: CPT | Mod: GC | Performed by: RADIOLOGY

## 2024-11-13 RX ORDER — IOPAMIDOL 755 MG/ML
70 INJECTION, SOLUTION INTRAVASCULAR ONCE
Status: COMPLETED | OUTPATIENT
Start: 2024-11-13 | End: 2024-11-13

## 2024-11-13 RX ADMIN — IOPAMIDOL 70 ML: 755 INJECTION, SOLUTION INTRAVASCULAR at 14:11

## 2024-11-18 ENCOUNTER — ANCILLARY PROCEDURE (OUTPATIENT)
Dept: CARDIOLOGY | Facility: CLINIC | Age: 54
End: 2024-11-18
Attending: INTERNAL MEDICINE
Payer: COMMERCIAL

## 2024-11-18 DIAGNOSIS — R55 SYNCOPE: ICD-10-CM

## 2024-11-18 DIAGNOSIS — Z95.818 STATUS POST PLACEMENT OF IMPLANTABLE LOOP RECORDER: ICD-10-CM

## 2024-12-01 NOTE — TELEPHONE ENCOUNTER
I agree that her lipids look pretty good.  She has an excellent HDL and normal LDL, although slightly above goal for patients at high risk, including those who have had previous strokes.  However, I do not think her strokes were related to atherosclerotic disease.  There is no urgent need to start a statin.  The benefit of a statin in her case would be relatively low.  I would suggest that she continue dietary efforts at reducing cholesterol and recheck the cholesterol panel in 6 months.  Stevan Pierson MD

## 2025-02-03 ENCOUNTER — TELEPHONE (OUTPATIENT)
Dept: CARDIOLOGY | Facility: CLINIC | Age: 55
End: 2025-02-03
Payer: COMMERCIAL

## 2025-02-03 NOTE — TELEPHONE ENCOUNTER
Alert receieved for a 3.3sec pause episode that occurred on 2/1/25 @ 2031. This is not a new finding for patient and typically is related to some type of GI source. Did call patient to assess for any associated symptoms. Waiting on a call back.  ALONSO RN

## 2025-02-03 NOTE — TELEPHONE ENCOUNTER
Pt called back and states she was eating and also had recent dental work and still has stitches. Asymptomatic.    Cont to monitor.  Ann PFEIFFER

## 2025-02-25 ENCOUNTER — ANCILLARY PROCEDURE (OUTPATIENT)
Dept: CARDIOLOGY | Facility: CLINIC | Age: 55
End: 2025-02-25
Attending: INTERNAL MEDICINE
Payer: COMMERCIAL

## 2025-02-25 DIAGNOSIS — Z95.818 STATUS POST PLACEMENT OF IMPLANTABLE LOOP RECORDER: ICD-10-CM

## 2025-02-25 DIAGNOSIS — R55 SYNCOPE: ICD-10-CM

## 2025-02-25 PROCEDURE — 93298 REM INTERROG DEV EVAL SCRMS: CPT | Performed by: INTERNAL MEDICINE

## 2025-02-27 LAB
MDC_IDC_EPISODE_DTM: NORMAL
MDC_IDC_EPISODE_DTM: NORMAL
MDC_IDC_EPISODE_DURATION: 60 S
MDC_IDC_EPISODE_ID: NORMAL
MDC_IDC_EPISODE_ID: NORMAL
MDC_IDC_EPISODE_TYPE: NORMAL
MDC_IDC_EPISODE_TYPE: NORMAL
MDC_IDC_EPISODE_VENDOR_TYPE: NORMAL
MDC_IDC_MSMT_BATTERY_DTM: NORMAL
MDC_IDC_MSMT_BATTERY_STATUS: NORMAL
MDC_IDC_PG_IMPLANT_DTM: NORMAL
MDC_IDC_PG_MFG: NORMAL
MDC_IDC_PG_MODEL: NORMAL
MDC_IDC_PG_SERIAL: NORMAL
MDC_IDC_PG_TYPE: NORMAL
MDC_IDC_SESS_CLINIC_NAME: NORMAL
MDC_IDC_SESS_DTM: NORMAL
MDC_IDC_SESS_TYPE: NORMAL
MDC_IDC_STAT_AT_BURDEN_PERCENT: 0 %
MDC_IDC_STAT_AT_DTM_END: NORMAL
MDC_IDC_STAT_AT_DTM_START: NORMAL

## 2025-03-06 ENCOUNTER — TELEPHONE (OUTPATIENT)
Dept: CARDIOLOGY | Facility: CLINIC | Age: 55
End: 2025-03-06
Payer: COMMERCIAL

## 2025-03-06 NOTE — TELEPHONE ENCOUNTER
Received ILR alert for pause. EKG shows 2 back-to-back pauses, 4.1 seconds and 2.9 seconds. SR with 3 non-conducted P waves, 1 normal beat, 2 more non-conducted P waves, then return to SR. Occurred 3/5/2025 at 3:49pm.            Pt has had similar and even longer pauses in the past, associated with GI triggers. In 11/2024 she had a 5.9 second pause associated with pre-syncope and vomiting, Dr. Aragon aware and recommended continuing to monitor.     Called pt, someone else answered and said pt was not available, he took down device clinic phone number and will have pt call us back. He asked for more information, but no CTC on file, so cannot give out more info    [facility] RN callback number: [phone]

## 2025-03-07 NOTE — TELEPHONE ENCOUNTER
Called again but pt was not available. Spoke with  who will give her a message to return call to [facility] RN callback number: [phone]     Saray Avery, RN

## 2025-03-12 NOTE — TELEPHONE ENCOUNTER
3rd call to check for symptoms.   Pt states she was eating at the time. No symptoms at time of episode  Ann PFEIFFER

## 2025-04-13 ENCOUNTER — HEALTH MAINTENANCE LETTER (OUTPATIENT)
Age: 55
End: 2025-04-13

## 2025-04-16 ENCOUNTER — TELEPHONE (OUTPATIENT)
Dept: CARDIOLOGY | Facility: CLINIC | Age: 55
End: 2025-04-16
Payer: COMMERCIAL

## 2025-04-16 NOTE — TELEPHONE ENCOUNTER
Remote ILR alert received for 2 pauses, 3.2 seconds and 3.1 seconds, occurred yesterday 4/15/2025 at 12:52pm and 12:57pm.     Pt has had similar and even longer pauses in the past, associated with GI triggers. In 11/2024 she had a 5.9 second pause associated with pre-syncope and vomiting, Dr. Aragon aware and recommended continuing to monitor.     Called pt. She said she did not have any symptoms at the time and she is pretty sure she was eating at that time. I told her the pauses were 3.2 seconds and 3.1 seconds, and she has had longer ones in the past, so as long as she was asymptomatic, there are no concerns and we will continue to monitor. Pt states understanding.              Episode # [id number]:

## 2025-06-04 ENCOUNTER — ANCILLARY PROCEDURE (OUTPATIENT)
Dept: CARDIOLOGY | Facility: CLINIC | Age: 55
End: 2025-06-04
Attending: INTERNAL MEDICINE
Payer: COMMERCIAL

## 2025-06-04 DIAGNOSIS — R55 SYNCOPE: ICD-10-CM

## 2025-06-04 DIAGNOSIS — Z95.818 STATUS POST PLACEMENT OF IMPLANTABLE LOOP RECORDER: ICD-10-CM

## 2025-06-04 PROCEDURE — 93298 REM INTERROG DEV EVAL SCRMS: CPT | Performed by: INTERNAL MEDICINE

## 2025-06-15 ENCOUNTER — HEALTH MAINTENANCE LETTER (OUTPATIENT)
Age: 55
End: 2025-06-15

## 2025-06-19 ENCOUNTER — TELEPHONE (OUTPATIENT)
Dept: CARDIOLOGY | Facility: CLINIC | Age: 55
End: 2025-06-19
Payer: COMMERCIAL

## 2025-06-19 NOTE — TELEPHONE ENCOUNTER
2nd attempt- Left voicemail for the patient to call back and schedule the following:    Appointment type:  Return Cardiology  Provider:  Stevan Pierson  Return date:  8/22/25  Additional appointment(s) needed:  NA    Additional Notes:  If PT returns call offer ELIEL appt with Dana MEMBRENO, Devan DARLING, Eleanor DARLING, or [place] PARSONS Otherwise first avail for Dr Pierson will be in Nov/Dec.    PT is currently on the wait list for Dr Pierson    Specialty phone number: [phone]

## 2025-09-02 ENCOUNTER — ANCILLARY PROCEDURE (OUTPATIENT)
Dept: CARDIOLOGY | Facility: CLINIC | Age: 55
End: 2025-09-02
Attending: INTERNAL MEDICINE
Payer: COMMERCIAL

## 2025-09-02 DIAGNOSIS — Z95.818 STATUS POST PLACEMENT OF IMPLANTABLE LOOP RECORDER: ICD-10-CM

## 2025-09-02 DIAGNOSIS — R55 SYNCOPE: ICD-10-CM

## 2025-09-03 LAB
MDC_IDC_EPISODE_DTM: NORMAL
MDC_IDC_EPISODE_DURATION: 6 S
MDC_IDC_EPISODE_ID: NORMAL
MDC_IDC_EPISODE_TYPE: NORMAL
MDC_IDC_EPISODE_VENDOR_TYPE: NORMAL
MDC_IDC_MSMT_BATTERY_DTM: NORMAL
MDC_IDC_MSMT_BATTERY_STATUS: NORMAL
MDC_IDC_PG_IMPLANT_DTM: NORMAL
MDC_IDC_PG_MFG: NORMAL
MDC_IDC_PG_MODEL: NORMAL
MDC_IDC_PG_SERIAL: NORMAL
MDC_IDC_PG_TYPE: NORMAL
MDC_IDC_SESS_CLINIC_NAME: NORMAL
MDC_IDC_SESS_DTM: NORMAL
MDC_IDC_SESS_TYPE: NORMAL
MDC_IDC_SET_ZONE_TYPE: NORMAL
MDC_IDC_SET_ZONE_VENDOR_TYPE: NORMAL
MDC_IDC_STAT_AT_BURDEN_PERCENT: 0 %
MDC_IDC_STAT_AT_DTM_END: NORMAL
MDC_IDC_STAT_AT_DTM_START: NORMAL